# Patient Record
Sex: FEMALE | Race: OTHER | NOT HISPANIC OR LATINO | URBAN - METROPOLITAN AREA
[De-identification: names, ages, dates, MRNs, and addresses within clinical notes are randomized per-mention and may not be internally consistent; named-entity substitution may affect disease eponyms.]

---

## 2023-02-11 ENCOUNTER — EMERGENCY (EMERGENCY)
Facility: HOSPITAL | Age: 88
LOS: 1 days | Discharge: HOME | End: 2023-02-11
Attending: EMERGENCY MEDICINE | Admitting: EMERGENCY MEDICINE
Payer: MEDICARE

## 2023-02-11 VITALS
SYSTOLIC BLOOD PRESSURE: 182 MMHG | TEMPERATURE: 96 F | HEART RATE: 84 BPM | OXYGEN SATURATION: 95 % | DIASTOLIC BLOOD PRESSURE: 72 MMHG | RESPIRATION RATE: 18 BRPM

## 2023-02-11 VITALS
HEART RATE: 65 BPM | TEMPERATURE: 95 F | RESPIRATION RATE: 16 BRPM | DIASTOLIC BLOOD PRESSURE: 79 MMHG | OXYGEN SATURATION: 95 % | HEIGHT: 65 IN | SYSTOLIC BLOOD PRESSURE: 154 MMHG | WEIGHT: 121.92 LBS

## 2023-02-11 LAB
ALBUMIN SERPL ELPH-MCNC: 3.7 G/DL — SIGNIFICANT CHANGE UP (ref 3.3–5)
ALP SERPL-CCNC: 107 U/L — SIGNIFICANT CHANGE UP (ref 40–120)
ALT FLD-CCNC: 33 U/L — SIGNIFICANT CHANGE UP (ref 10–45)
ANION GAP SERPL CALC-SCNC: 13 MMOL/L — SIGNIFICANT CHANGE UP (ref 5–17)
APPEARANCE UR: CLEAR — SIGNIFICANT CHANGE UP
AST SERPL-CCNC: 34 U/L — SIGNIFICANT CHANGE UP (ref 10–40)
BACTERIA # UR AUTO: SIGNIFICANT CHANGE UP /HPF
BASOPHILS # BLD AUTO: 0.02 K/UL — SIGNIFICANT CHANGE UP (ref 0–0.2)
BASOPHILS NFR BLD AUTO: 0.3 % — SIGNIFICANT CHANGE UP (ref 0–2)
BILIRUB SERPL-MCNC: 0.6 MG/DL — SIGNIFICANT CHANGE UP (ref 0.2–1.2)
BILIRUB UR-MCNC: NEGATIVE — SIGNIFICANT CHANGE UP
BUN SERPL-MCNC: 34 MG/DL — HIGH (ref 7–23)
CALCIUM SERPL-MCNC: 9.5 MG/DL — SIGNIFICANT CHANGE UP (ref 8.4–10.5)
CHLORIDE SERPL-SCNC: 96 MMOL/L — SIGNIFICANT CHANGE UP (ref 96–108)
CO2 SERPL-SCNC: 22 MMOL/L — SIGNIFICANT CHANGE UP (ref 22–31)
COLOR SPEC: YELLOW — SIGNIFICANT CHANGE UP
CREAT SERPL-MCNC: 0.93 MG/DL — SIGNIFICANT CHANGE UP (ref 0.5–1.3)
DIFF PNL FLD: NEGATIVE — SIGNIFICANT CHANGE UP
EGFR: 60 ML/MIN/1.73M2 — SIGNIFICANT CHANGE UP
EOSINOPHIL # BLD AUTO: 0.03 K/UL — SIGNIFICANT CHANGE UP (ref 0–0.5)
EOSINOPHIL NFR BLD AUTO: 0.5 % — SIGNIFICANT CHANGE UP (ref 0–6)
EPI CELLS # UR: SIGNIFICANT CHANGE UP /HPF (ref 0–5)
GLUCOSE SERPL-MCNC: 200 MG/DL — HIGH (ref 70–99)
GLUCOSE UR QL: NEGATIVE — SIGNIFICANT CHANGE UP
HCT VFR BLD CALC: 31.4 % — LOW (ref 34.5–45)
HGB BLD-MCNC: 10.4 G/DL — LOW (ref 11.5–15.5)
IMM GRANULOCYTES NFR BLD AUTO: 0.2 % — SIGNIFICANT CHANGE UP (ref 0–0.9)
KETONES UR-MCNC: ABNORMAL MG/DL
LEUKOCYTE ESTERASE UR-ACNC: ABNORMAL
LIDOCAIN IGE QN: 16 U/L — SIGNIFICANT CHANGE UP (ref 7–60)
LYMPHOCYTES # BLD AUTO: 1.05 K/UL — SIGNIFICANT CHANGE UP (ref 1–3.3)
LYMPHOCYTES # BLD AUTO: 16.9 % — SIGNIFICANT CHANGE UP (ref 13–44)
MCHC RBC-ENTMCNC: 30.4 PG — SIGNIFICANT CHANGE UP (ref 27–34)
MCHC RBC-ENTMCNC: 33.1 GM/DL — SIGNIFICANT CHANGE UP (ref 32–36)
MCV RBC AUTO: 91.8 FL — SIGNIFICANT CHANGE UP (ref 80–100)
MONOCYTES # BLD AUTO: 0.09 K/UL — SIGNIFICANT CHANGE UP (ref 0–0.9)
MONOCYTES NFR BLD AUTO: 1.4 % — LOW (ref 2–14)
NEUTROPHILS # BLD AUTO: 5.01 K/UL — SIGNIFICANT CHANGE UP (ref 1.8–7.4)
NEUTROPHILS NFR BLD AUTO: 80.7 % — HIGH (ref 43–77)
NITRITE UR-MCNC: NEGATIVE — SIGNIFICANT CHANGE UP
NRBC # BLD: 0 /100 WBCS — SIGNIFICANT CHANGE UP (ref 0–0)
PH UR: 6 — SIGNIFICANT CHANGE UP (ref 5–8)
PLATELET # BLD AUTO: 176 K/UL — SIGNIFICANT CHANGE UP (ref 150–400)
POTASSIUM SERPL-MCNC: 4 MMOL/L — SIGNIFICANT CHANGE UP (ref 3.5–5.3)
POTASSIUM SERPL-SCNC: 4 MMOL/L — SIGNIFICANT CHANGE UP (ref 3.5–5.3)
PROT SERPL-MCNC: 7 G/DL — SIGNIFICANT CHANGE UP (ref 6–8.3)
PROT UR-MCNC: NEGATIVE MG/DL — SIGNIFICANT CHANGE UP
RBC # BLD: 3.42 M/UL — LOW (ref 3.8–5.2)
RBC # FLD: 16 % — HIGH (ref 10.3–14.5)
RBC CASTS # UR COMP ASSIST: < 5 /HPF — SIGNIFICANT CHANGE UP
SARS-COV-2 RNA SPEC QL NAA+PROBE: NEGATIVE — SIGNIFICANT CHANGE UP
SODIUM SERPL-SCNC: 131 MMOL/L — LOW (ref 135–145)
SP GR SPEC: 1.02 — SIGNIFICANT CHANGE UP (ref 1–1.03)
TROPONIN T SERPL-MCNC: <0.01 NG/ML — SIGNIFICANT CHANGE UP (ref 0–0.01)
UROBILINOGEN FLD QL: 0.2 E.U./DL — SIGNIFICANT CHANGE UP
WBC # BLD: 6.21 K/UL — SIGNIFICANT CHANGE UP (ref 3.8–10.5)
WBC # FLD AUTO: 6.21 K/UL — SIGNIFICANT CHANGE UP (ref 3.8–10.5)
WBC UR QL: < 5 /HPF — SIGNIFICANT CHANGE UP

## 2023-02-11 PROCEDURE — 99284 EMERGENCY DEPT VISIT MOD MDM: CPT

## 2023-02-11 PROCEDURE — 83690 ASSAY OF LIPASE: CPT

## 2023-02-11 PROCEDURE — 93005 ELECTROCARDIOGRAM TRACING: CPT

## 2023-02-11 PROCEDURE — 99285 EMERGENCY DEPT VISIT HI MDM: CPT | Mod: 25

## 2023-02-11 PROCEDURE — 82962 GLUCOSE BLOOD TEST: CPT

## 2023-02-11 PROCEDURE — 85025 COMPLETE CBC W/AUTO DIFF WBC: CPT

## 2023-02-11 PROCEDURE — 71045 X-RAY EXAM CHEST 1 VIEW: CPT | Mod: 26

## 2023-02-11 PROCEDURE — 80053 COMPREHEN METABOLIC PANEL: CPT

## 2023-02-11 PROCEDURE — 71045 X-RAY EXAM CHEST 1 VIEW: CPT

## 2023-02-11 PROCEDURE — 81001 URINALYSIS AUTO W/SCOPE: CPT

## 2023-02-11 PROCEDURE — 85730 THROMBOPLASTIN TIME PARTIAL: CPT

## 2023-02-11 PROCEDURE — 84484 ASSAY OF TROPONIN QUANT: CPT

## 2023-02-11 PROCEDURE — 36415 COLL VENOUS BLD VENIPUNCTURE: CPT

## 2023-02-11 PROCEDURE — 87635 SARS-COV-2 COVID-19 AMP PRB: CPT

## 2023-02-11 PROCEDURE — 85610 PROTHROMBIN TIME: CPT

## 2023-02-11 RX ORDER — SODIUM CHLORIDE 9 MG/ML
1000 INJECTION INTRAMUSCULAR; INTRAVENOUS; SUBCUTANEOUS ONCE
Refills: 0 | Status: COMPLETED | OUTPATIENT
Start: 2023-02-11 | End: 2023-02-11

## 2023-02-11 RX ADMIN — SODIUM CHLORIDE 1000 MILLILITER(S): 9 INJECTION INTRAMUSCULAR; INTRAVENOUS; SUBCUTANEOUS at 11:51

## 2023-02-11 RX ADMIN — SODIUM CHLORIDE 1000 MILLILITER(S): 9 INJECTION INTRAMUSCULAR; INTRAVENOUS; SUBCUTANEOUS at 10:34

## 2023-02-11 NOTE — ED ADULT NURSE NOTE - NSIMPLEMENTINTERV_GEN_ALL_ED
Implemented All Fall with Harm Risk Interventions:  [place] to call system. Call bell, personal items and telephone within reach. Instruct patient to call for assistance. Room bathroom lighting operational. Non-slip footwear when patient is off stretcher. Physically safe environment: no spills, clutter or unnecessary equipment. Stretcher in lowest position, wheels locked, appropriate side rails in place. Provide visual cue, wrist band, yellow gown, etc. Monitor gait and stability. Monitor for mental status changes and reorient to person, place, and time. Review medications for side effects contributing to fall risk. Reinforce activity limits and safety measures with patient and family. Provide visual clues: red socks.

## 2023-02-11 NOTE — ED PROVIDER NOTE - CLINICAL SUMMARY MEDICAL DECISION MAKING FREE TEXT BOX
88 y/o F with a PMHx of stage IV cancer presenting s/p syncopal episode and weakness. Vitals are within normal limits. Labs showing: white count at 6, BUN at 34, creatinine at 9.3m troponin negative, and urine with trace loops but otherwise negative, COVID-19 negative. Plan to give 2L of fluid and reevaluate but if feeling better will discharge. 86 y/o F with a PMHx of stage IV cancer presenting s/p syncopal episode and weakness. Vitals are within normal limits. Labs showing: white count at 6, BUN at 34, creatinine at 9.3m troponin negative, and urine with trace loops but otherwise negative, COVID-19 negative. Plan to give 2L of fluid and reevaluate but if feeling better will discharge.    Pt tolerating po in ED.  Discussed with daughter and agrees on plan for hydration, well balanced meals.   Most likely side effect of chemo causing symptoms.

## 2023-02-11 NOTE — ED PROVIDER NOTE - PHYSICAL EXAMINATION
VITAL SIGNS: I have reviewed nursing notes and confirm.  CONSTITUTIONAL: Weak appearing  SKIN: Agree with RN documentation regarding decubitus evaluation. Remainder of skin exam is warm and dry, no acute rash.  HEAD: Normocephalic; atraumatic.  EYES: PERRL, EOM intact; conjunctiva and sclera clear.  ENT: Dry mucous membranes. No nasal discharge; airway clear.  NECK: Supple; non tender.  CARD: S1, S2 normal; no murmurs, gallops, or rubs. Regular rate and rhythm.  RESP: No wheezes, rales or rhonchi.  ABD: Normal bowel sounds; soft; non-distended; non-tender; no hepatosplenomegaly.  EXT: Normal ROM. No clubbing, cyanosis or edema.  LYMPH: No acute cervical adenopathy.  NEURO: Alert, oriented. Grossly unremarkable.  PSYCH: Cooperative, appropriate.

## 2023-02-11 NOTE — ED PROVIDER NOTE - NSFOLLOWUPINSTRUCTIONS_ED_ALL_ED_FT
Stay hydrated and eat well balanced meals.  Follow up with your doctor at [facility].  Return to ED with any worsening symptoms or other concerns.        Syncope, Adult    Outline of the head showing blood vessels that supply the brain.   Syncope refers to a condition in which a person temporarily loses consciousness. Syncope may also be called fainting or passing out. It is caused by a sudden decrease in blood flow to the brain. This can happen for a variety of reasons.    Most causes of syncope are not dangerous. It can be triggered by things such as needle sticks, seeing blood, pain, or intense emotion. However, syncope can also be a sign of a serious medical problem, such as a heart abnormality. Other causes can include dehydration, migraines, or taking medicines that lower blood pressure. Your health care provider may do tests to find the reason why you are having syncope.    If you faint, get medical help right away. Call your local emergency services (911 in the [place]      Follow these instructions at home:    Pay attention to any changes in your symptoms. Take these actions to stay safe and to help relieve your symptoms:    Knowing when you may be about to faint   •Signs that you may be about to faint include:  •Feeling dizzy, weak, light-headed, or like the room is spinning.      •Feeling nauseous.      •Seeing spots or seeing all white or all black in your field of vision.      •Having cold, clammy skin or feeling warm and sweaty.      •Hearing ringing in the ears (tinnitus).      •If you start to feel like you might faint, sit or lie down right away. If sitting, put your head down between your legs. If lying down, raise (elevate) your feet above the level of your heart.  •Breathe deeply and steadily. Wait until all the symptoms have passed.      •Have someone stay with you until you feel stable.        Medicines     •Take over-the-counter and prescription medicines only as told by your health care provider.      •If you are taking blood pressure or heart medicine, get up slowly and take several minutes to sit and then stand. This can reduce dizziness and decrease the risk of syncope.      Lifestyle     • Do not drive, use machinery, or play sports until your health care provider says it is okay.      • Do not drink alcohol.      • Do not use any products that contain nicotine or tobacco. These products include cigarettes, chewing tobacco, and vaping devices, such as e-cigarettes. If you need help quitting, ask your health care provider.      •Avoid hot tubs and saunas.      General instructions     •Talk with your health care provider about your symptoms. You may need to have testing to understand the cause of your syncope.      •Drink enough fluid to keep your urine pale yellow.    •Avoid prolonged standing. If you must stand for a long time, do movements such as:  •Moving your legs.      •Crossing your legs.      •Flexing and stretching your leg muscles.      •Squatting.        •Keep all follow-up visits. This is important.        Contact a health care provider if:    •You have episodes of near fainting.        Get help right away if:    •You faint.      •You hit your head or are injured after fainting.    •You have any of these symptoms that may indicate trouble with your heart:  •Fast or irregular heartbeats (palpitations).      •Unusual pain in your chest, abdomen, or back.      •Shortness of breath.        •You have a seizure.      •You have a severe headache.      •You are confused.      •You have vision problems.      •You have severe weakness or trouble walking.      •You are bleeding from your mouth or rectum, or you have black or tarry stool.      These symptoms may represent a serious problem that is an emergency. Do not wait to see if your symptoms will go away. Get medical help right away. Call your local emergency services (911 in the [place] Do not drive yourself to the hospital.       Summary    •Syncope refers to a condition in which a person temporarily loses consciousness. Syncope may also be called fainting or passing out. It is caused by a sudden decrease in blood flow to the brain.      •Signs that you may be about to faint include dizziness, feeling light-headed, feeling nauseous, sudden vision changes, or cold, clammy skin.      •Even though most causes of syncope are not dangerous, syncope can be a sign of a serious medical problem. Get help right away if you faint.      •If you start to feel like you might faint, sit or lie down right away. If sitting, put your head down between your legs. If lying down, raise (elevate) your feet above the level of your heart.      This information is not intended to replace advice given to you by your health care provider. Make sure you discuss any questions you have with your health care provider.      Document Revised: 04/28/2022 Document Reviewed: 04/28/2022    Elsevier Patient Education © 2022 [employer]

## 2023-02-11 NOTE — ED ADULT NURSE NOTE - OBJECTIVE STATEMENT
.  87years female alert mental state (AOX3) received by EMS.  -Allergy: N/A.  -complain of syncope  Hx of gallbladder CA. pt had syncope today morning. pt got chemo Tx on Wednesday. pt had nausea and vomiting on EMS. pt presents no complain.  -denied chest pain, SOB, hitting head, LOC, abdomen pain, dizziness, headache, sensory change.  Pt is in the bed comfortably at this time. Will continue to monitor and document any changes.

## 2023-02-11 NOTE — ED ADULT TRIAGE NOTE - CHIEF COMPLAINT QUOTE
Pt presents to ED by EMS C/O one episode of syncope at home witnessed by Daughter. Daughter states, " I noticed her legs were spread out and she fainted on the bar stool, she lost consciousness so I called an ambulance but then she came to, she was complaining of nausea when she woke up". Daughter denies falls/ head strike. Pt received 6th chemo treatment Wed, hx gallbladder CA. Pt Neuro intact, awake and alert, complains of generalized fatigue.

## 2023-02-11 NOTE — ED ADULT TRIAGE NOTE - ADDITIONAL SAFETY/BANDS...
Patient states she has a really bad migraine right now and she knows here provider may not be in clinic tomorrow.   Additional Safety/Bands:

## 2023-02-11 NOTE — ED PROVIDER NOTE - PATIENT PORTAL LINK FT
You can access the FollowMyHealth Patient Portal offered by [facility] by registering at the following website: [email or web address]. By joining [facility]’s FollowMyHealth portal, you will also be able to view your health information using other applications (apps) compatible with our system.

## 2023-02-11 NOTE — ED PROVIDER NOTE - WET READ LAUNCH FT
Partially impaired: cannot see medication labels or newsprint, but can see obstacles in path, and the surrounding layout; can count fingers at arm's length
There are no Wet Read(s) to document.

## 2023-02-11 NOTE — ED PROVIDER NOTE - OBJECTIVE STATEMENT
86 y/o F with a PMHx of stage IV gallbladder cancer (chemo at [facility] every 2 weeks, off for 1 week) is here with her daughter as they love together. Pt states she usually has no side effects from chemo. Pt's last chemo was Wednesday and today pt felt weak, got dizzy, and passed out for a few minutes in the chair. In ED pt is just tired and weak. Pt denies any fevers or chills and states she just wanted to sleep. Pt also had urinary frequency 2d ago but denies any vomiting or diarrhea.

## 2023-02-11 NOTE — ED PROVIDER NOTE - IV ALTEPLASE EXCL REL HIDDEN
Abdomen , soft, nontender, nondistended , no guarding or rigidity , no masses palpable , normal bowel sounds , Liver and Spleen , no hepatomegaly present , no hepatosplenomegaly , liver nontender , spleen not palpable, no inguinal hernia appreciated
show

## 2023-02-14 DIAGNOSIS — C23 MALIGNANT NEOPLASM OF GALLBLADDER: ICD-10-CM

## 2023-02-14 DIAGNOSIS — R55 SYNCOPE AND COLLAPSE: ICD-10-CM

## 2023-02-14 DIAGNOSIS — R53.1 WEAKNESS: ICD-10-CM

## 2023-02-14 DIAGNOSIS — I44.7 LEFT BUNDLE-BRANCH BLOCK, UNSPECIFIED: ICD-10-CM

## 2023-02-14 DIAGNOSIS — R42 DIZZINESS AND GIDDINESS: ICD-10-CM

## 2023-02-14 DIAGNOSIS — Z20.822 CONTACT WITH AND (SUSPECTED) EXPOSURE TO COVID-19: ICD-10-CM

## 2023-02-14 DIAGNOSIS — R35.0 FREQUENCY OF MICTURITION: ICD-10-CM

## 2023-05-02 ENCOUNTER — EMERGENCY (EMERGENCY)
Facility: HOSPITAL | Age: 88
LOS: 1 days | Discharge: HOME | End: 2023-05-02
Attending: EMERGENCY MEDICINE | Admitting: EMERGENCY MEDICINE
Payer: MEDICARE

## 2023-05-02 VITALS
SYSTOLIC BLOOD PRESSURE: 146 MMHG | OXYGEN SATURATION: 94 % | RESPIRATION RATE: 18 BRPM | WEIGHT: 123.02 LBS | HEIGHT: 61 IN | DIASTOLIC BLOOD PRESSURE: 64 MMHG | HEART RATE: 73 BPM | TEMPERATURE: 98 F

## 2023-05-02 VITALS
DIASTOLIC BLOOD PRESSURE: 68 MMHG | TEMPERATURE: 98 F | HEART RATE: 78 BPM | OXYGEN SATURATION: 96 % | SYSTOLIC BLOOD PRESSURE: 186 MMHG | RESPIRATION RATE: 18 BRPM

## 2023-05-02 DIAGNOSIS — M25.78 OSTEOPHYTE, VERTEBRAE: ICD-10-CM

## 2023-05-02 DIAGNOSIS — Z23 ENCOUNTER FOR IMMUNIZATION: ICD-10-CM

## 2023-05-02 DIAGNOSIS — R55 SYNCOPE AND COLLAPSE: ICD-10-CM

## 2023-05-02 DIAGNOSIS — S80.12XA CONTUSION OF LEFT LOWER LEG, INITIAL ENCOUNTER: ICD-10-CM

## 2023-05-02 DIAGNOSIS — I44.7 LEFT BUNDLE-BRANCH BLOCK, UNSPECIFIED: ICD-10-CM

## 2023-05-02 DIAGNOSIS — S01.21XA LACERATION WITHOUT FOREIGN BODY OF NOSE, INITIAL ENCOUNTER: ICD-10-CM

## 2023-05-02 DIAGNOSIS — M77.8 OTHER ENTHESOPATHIES, NOT ELSEWHERE CLASSIFIED: ICD-10-CM

## 2023-05-02 DIAGNOSIS — M89.8X1 OTHER SPECIFIED DISORDERS OF BONE, SHOULDER: ICD-10-CM

## 2023-05-02 DIAGNOSIS — M43.12 SPONDYLOLISTHESIS, CERVICAL REGION: ICD-10-CM

## 2023-05-02 DIAGNOSIS — M48.02 SPINAL STENOSIS, CERVICAL REGION: ICD-10-CM

## 2023-05-02 DIAGNOSIS — S40.012A CONTUSION OF LEFT SHOULDER, INITIAL ENCOUNTER: ICD-10-CM
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
## 2023-05-02 DIAGNOSIS — S02.2XXA FRACTURE OF NASAL BONES, INITIAL ENCOUNTER FOR CLOSED FRACTURE: ICD-10-CM

## 2023-05-02 DIAGNOSIS — C23 MALIGNANT NEOPLASM OF GALLBLADDER: ICD-10-CM

## 2023-05-02 LAB
ALBUMIN SERPL ELPH-MCNC: 3.8 G/DL — SIGNIFICANT CHANGE UP (ref 3.3–5)
ALP SERPL-CCNC: 113 U/L — SIGNIFICANT CHANGE UP (ref 40–120)
ALT FLD-CCNC: 19 U/L — SIGNIFICANT CHANGE UP (ref 10–45)
ANION GAP SERPL CALC-SCNC: 13 MMOL/L — SIGNIFICANT CHANGE UP (ref 5–17)
ANISOCYTOSIS BLD QL: SIGNIFICANT CHANGE UP
AST SERPL-CCNC: 37 U/L — SIGNIFICANT CHANGE UP (ref 10–40)
BASOPHILS # BLD AUTO: 0 K/UL — SIGNIFICANT CHANGE UP (ref 0–0.2)
BASOPHILS NFR BLD AUTO: 0 % — SIGNIFICANT CHANGE UP (ref 0–2)
BILIRUB SERPL-MCNC: 0.3 MG/DL — SIGNIFICANT CHANGE UP (ref 0.2–1.2)
BUN SERPL-MCNC: 34 MG/DL — HIGH (ref 7–23)
CALCIUM SERPL-MCNC: 9 MG/DL — SIGNIFICANT CHANGE UP (ref 8.4–10.5)
CHLORIDE SERPL-SCNC: 101 MMOL/L — SIGNIFICANT CHANGE UP (ref 96–108)
CK MB CFR SERPL CALC: 1.5 NG/ML — SIGNIFICANT CHANGE UP (ref 0–6.7)
CK SERPL-CCNC: 46 U/L — SIGNIFICANT CHANGE UP (ref 25–170)
CO2 SERPL-SCNC: 21 MMOL/L — LOW (ref 22–31)
CREAT SERPL-MCNC: 1.01 MG/DL — SIGNIFICANT CHANGE UP (ref 0.5–1.3)
EGFR: 55 ML/MIN/1.73M2 — LOW
EOSINOPHIL # BLD AUTO: 0.02 K/UL — SIGNIFICANT CHANGE UP (ref 0–0.5)
EOSINOPHIL NFR BLD AUTO: 0.9 % — SIGNIFICANT CHANGE UP (ref 0–6)
GLUCOSE SERPL-MCNC: 150 MG/DL — HIGH (ref 70–99)
HCT VFR BLD CALC: 22.3 % — LOW (ref 34.5–45)
HGB BLD-MCNC: 7.4 G/DL — LOW (ref 11.5–15.5)
HYPOCHROMIA BLD QL: SLIGHT — SIGNIFICANT CHANGE UP
LYMPHOCYTES # BLD AUTO: 1.02 K/UL — SIGNIFICANT CHANGE UP (ref 1–3.3)
LYMPHOCYTES # BLD AUTO: 38.3 % — SIGNIFICANT CHANGE UP (ref 13–44)
MACROCYTES BLD QL: SIGNIFICANT CHANGE UP
MANUAL SMEAR VERIFICATION: SIGNIFICANT CHANGE UP
MCHC RBC-ENTMCNC: 33.2 GM/DL — SIGNIFICANT CHANGE UP (ref 32–36)
MCHC RBC-ENTMCNC: 33.5 PG — SIGNIFICANT CHANGE UP (ref 27–34)
MCV RBC AUTO: 100.9 FL — HIGH (ref 80–100)
MICROCYTES BLD QL: SLIGHT — SIGNIFICANT CHANGE UP
MONOCYTES # BLD AUTO: 0.21 K/UL — SIGNIFICANT CHANGE UP (ref 0–0.9)
MONOCYTES NFR BLD AUTO: 7.8 % — SIGNIFICANT CHANGE UP (ref 2–14)
NEUTROPHILS # BLD AUTO: 1.42 K/UL — LOW (ref 1.8–7.4)
NEUTROPHILS NFR BLD AUTO: 51.3 % — SIGNIFICANT CHANGE UP (ref 43–77)
NEUTS BAND # BLD: 1.7 % — SIGNIFICANT CHANGE UP (ref 0–8)
NRBC # BLD: 1 /100 — HIGH (ref 0–0)
NRBC # BLD: SIGNIFICANT CHANGE UP /100 WBCS (ref 0–0)
OVALOCYTES BLD QL SMEAR: SLIGHT — SIGNIFICANT CHANGE UP
PLAT MORPH BLD: NORMAL — SIGNIFICANT CHANGE UP
PLATELET # BLD AUTO: 104 K/UL — LOW (ref 150–400)
POIKILOCYTOSIS BLD QL AUTO: SLIGHT — SIGNIFICANT CHANGE UP
POLYCHROMASIA BLD QL SMEAR: SLIGHT — SIGNIFICANT CHANGE UP
POTASSIUM SERPL-MCNC: 4 MMOL/L — SIGNIFICANT CHANGE UP (ref 3.5–5.3)
POTASSIUM SERPL-SCNC: 4 MMOL/L — SIGNIFICANT CHANGE UP (ref 3.5–5.3)
PROT SERPL-MCNC: 6.5 G/DL — SIGNIFICANT CHANGE UP (ref 6–8.3)
RBC # BLD: 2.21 M/UL — LOW (ref 3.8–5.2)
RBC # FLD: 18.1 % — HIGH (ref 10.3–14.5)
RBC BLD AUTO: ABNORMAL
SCHISTOCYTES BLD QL AUTO: SLIGHT — SIGNIFICANT CHANGE UP
SODIUM SERPL-SCNC: 135 MMOL/L — SIGNIFICANT CHANGE UP (ref 135–145)
TROPONIN T SERPL-MCNC: <0.01 NG/ML — SIGNIFICANT CHANGE UP (ref 0–0.01)
WBC # BLD: 2.67 K/UL — LOW (ref 3.8–10.5)
WBC # FLD AUTO: 2.67 K/UL — LOW (ref 3.8–10.5)

## 2023-05-02 PROCEDURE — 99285 EMERGENCY DEPT VISIT HI MDM: CPT | Mod: 25

## 2023-05-02 PROCEDURE — 70450 CT HEAD/BRAIN W/O DYE: CPT | Mod: MA

## 2023-05-02 PROCEDURE — 80053 COMPREHEN METABOLIC PANEL: CPT

## 2023-05-02 PROCEDURE — 70486 CT MAXILLOFACIAL W/O DYE: CPT | Mod: MA

## 2023-05-02 PROCEDURE — 85025 COMPLETE CBC W/AUTO DIFF WBC: CPT

## 2023-05-02 PROCEDURE — 90715 TDAP VACCINE 7 YRS/> IM: CPT

## 2023-05-02 PROCEDURE — 72125 CT NECK SPINE W/O DYE: CPT | Mod: MA

## 2023-05-02 PROCEDURE — 73030 X-RAY EXAM OF SHOULDER: CPT

## 2023-05-02 PROCEDURE — 73590 X-RAY EXAM OF LOWER LEG: CPT

## 2023-05-02 PROCEDURE — 70486 CT MAXILLOFACIAL W/O DYE: CPT | Mod: 26,MA

## 2023-05-02 PROCEDURE — 99285 EMERGENCY DEPT VISIT HI MDM: CPT | Mod: FS,25

## 2023-05-02 PROCEDURE — 12011 RPR F/E/E/N/L/M 2.5 CM/<: CPT

## 2023-05-02 PROCEDURE — 73590 X-RAY EXAM OF LOWER LEG: CPT | Mod: 26,LT

## 2023-05-02 PROCEDURE — 93005 ELECTROCARDIOGRAM TRACING: CPT | Mod: XU

## 2023-05-02 PROCEDURE — 82550 ASSAY OF CK (CPK): CPT

## 2023-05-02 PROCEDURE — 72125 CT NECK SPINE W/O DYE: CPT | Mod: 26,MA

## 2023-05-02 PROCEDURE — 90471 IMMUNIZATION ADMIN: CPT

## 2023-05-02 PROCEDURE — G0168: CPT

## 2023-05-02 PROCEDURE — 84484 ASSAY OF TROPONIN QUANT: CPT

## 2023-05-02 PROCEDURE — 36415 COLL VENOUS BLD VENIPUNCTURE: CPT

## 2023-05-02 PROCEDURE — 82553 CREATINE MB FRACTION: CPT

## 2023-05-02 PROCEDURE — 70450 CT HEAD/BRAIN W/O DYE: CPT | Mod: 26,MA

## 2023-05-02 PROCEDURE — 73030 X-RAY EXAM OF SHOULDER: CPT | Mod: 26,LT

## 2023-05-02 RX ORDER — SODIUM CHLORIDE 9 MG/ML
1000 INJECTION INTRAMUSCULAR; INTRAVENOUS; SUBCUTANEOUS ONCE
Refills: 0 | Status: COMPLETED | OUTPATIENT
Start: 2023-05-02 | End: 2023-05-02

## 2023-05-02 RX ORDER — ACETAMINOPHEN 500 MG
650 TABLET ORAL ONCE
Refills: 0 | Status: COMPLETED | OUTPATIENT
Start: 2023-05-02 | End: 2023-05-02

## 2023-05-02 RX ORDER — TETANUS TOXOID, REDUCED DIPHTHERIA TOXOID AND ACELLULAR PERTUSSIS VACCINE, ADSORBED 5; 2.5; 8; 8; 2.5 [IU]/.5ML; [IU]/.5ML; UG/.5ML; UG/.5ML; UG/.5ML
0.5 SUSPENSION INTRAMUSCULAR ONCE
Refills: 0 | Status: COMPLETED | OUTPATIENT
Start: 2023-05-02 | End: 2023-05-02

## 2023-05-02 RX ADMIN — SODIUM CHLORIDE 1000 MILLILITER(S): 9 INJECTION INTRAMUSCULAR; INTRAVENOUS; SUBCUTANEOUS at 10:31

## 2023-05-02 RX ADMIN — Medication 650 MILLIGRAM(S): at 10:31

## 2023-05-02 RX ADMIN — TETANUS TOXOID, REDUCED DIPHTHERIA TOXOID AND ACELLULAR PERTUSSIS VACCINE, ADSORBED 0.5 MILLILITER(S): 5; 2.5; 8; 8; 2.5 SUSPENSION INTRAMUSCULAR at 10:31

## 2023-05-02 NOTE — ED PROVIDER NOTE - PATIENT PORTAL LINK FT
You can access the FollowMyHealth Patient Portal offered by [facility] by registering at the following website: [email or web address]. By joining [facility]’s FollowMyHealth portal, you will also be able to view your health information using other applications (apps) compatible with our system.

## 2023-05-02 NOTE — ED PROVIDER NOTE - CLINICAL SUMMARY MEDICAL DECISION MAKING FREE TEXT BOX
86 yo F with pmh of gallbladder cancer on chemo (last treatment 1 week ago) presents s/p syncope w/ fall this morning.  Pt hit her head, nose and L shoulder and L leg on the floor. Initially had some bleeding to the bridge of her nose. Denies ha, dizziness, n/v, cp, sob, fever, chills, palpitations. Pt states she has been eating and drinking normally. neuro intact. +lac to nose. +ecchymosis to L shoulder and LLE. EKG old LBBB. Labs, XR, CT

## 2023-05-02 NOTE — ED ADULT NURSE NOTE - OBJECTIVE STATEMENT
.  87years female alert mental state (AOX3) received by EMS.  pt is ambulatory herself.  -complain of fall.  Hx fo bladder cancer. pt fell down when pt put her shoes in tody morning. pt did not have memory at that time.   -assess: 1cm laceration of nose. bruise of Lt shoulder and both lower legs.   -denied chest pain, SOB, fever, abdomen pain.  Pt is in the bed comfortably at this time. Will continue to monitor and document any changes.

## 2023-05-02 NOTE — ED PROVIDER NOTE - ATTENDING APP SHARED VISIT CONTRIBUTION OF CARE
87 F hx gallbladder CA on chemo last 1 week ago now with syncope and fall after bending over to put on slippers, positional in nature.  Feel and hit head with loc.  Pt feels fine.  No prodrome, no cp or sob.  VSS.  Patient is alert, oriented x person, place and time.  Cranial nerves 2-12 are intact.  Normal gait and speech.  Cerebellar testing normal:  negative Romberg, normal coordination and normal finger to nose, heal to shin and rapid alternating movements.  Normal proprioception and sensory exam.  No pronator drift.  5/5 bl upper extremity and lower extremity strength.  Mild ttp LLE.  Ambulatory.  Plan EKG, labs, CT head, xray, ivf and reassess VS/orthostatics.  Unlikely cardiac syncope.

## 2023-05-02 NOTE — ED ADULT NURSE NOTE - PRIMARY CARE PROVIDER
Z pack take 2 tablets today and 1 tablet for 4 days after  Use Flonase 1 spray each nostril in the morning and at night  Use Zyrtec at night before going to bed  And use Mucinex 12 hour during the day for congestion  Rest and Hydrate  Take BP daily, and write them down 
MD

## 2023-05-02 NOTE — ED ADULT TRIAGE NOTE - CHIEF COMPLAINT QUOTE
Pt w PMH active bladder cancer on chemo reports bending over to put shoes on and fell around 0835 this morning. pt does not fully know what happened. pt states " I was just on the floor." + head injury. no lost of LOC. Daughter present, states she was on the floor for about 30 seconds only. no blood thinners. Lac to nose, bruise to left leg, and left forehead.  by ems.

## 2023-05-02 NOTE — ED PROVIDER NOTE - PHYSICAL EXAMINATION
CONSTITUTIONAL: Well-appearing;  in no apparent distress.   HEAD: Normocephalic; atraumatic.   EYES: PERRL; EOM intact; conjunctiva and sclera clear; 0.5cm superficial lac to bridge of nose, no active bleeding   ENT: normal nose; no rhinorrhea; normal pharynx with no erythema or lesions.   NECK: Supple; non-tender; no LAD  CARDIOVASCULAR: Normal S1, S2; No audible murmurs. Regular rate and rhythm.   RESPIRATORY: Breathing easily; breath sounds clear and equal bilaterally; no wheezes, rhonchi, or rales.  GI: Soft; non-distended; non-tender; no palpable organomegaly.   [facility]: FROM at all extremities, normal tone; L shoulder +ecchymosis, minimal tenderness, FROM. LLE- ecchymosis to L lateral shin, +tenderness. FROM at hips and knees   EXT: No cyanosis or edema; N/V intact  SKIN: Normal for age and race; warm; dry; good turgor; no apparent lesions or rash.   NEURO:  AAO x 3, CN II-XII intact, normal speech, strength 5/5 bilateral upper and lower extremities, sensation intact, cerebellum intact, no ataxia, follows commands appropriately   PSYCHOLOGICAL: The patient’s mood and manner are appropriate.

## 2023-05-02 NOTE — ED PROVIDER NOTE - NSFOLLOWUPINSTRUCTIONS_ED_ALL_ED_FT
Syncope    Syncope is when you temporarily lose consciousness, also called fainting or passing out. It is caused by a sudden decrease in blood flow to the brain. Even though most causes of syncope are not dangerous, syncope can possibly be a sign of a serious medical problem. Signs that you may be about to faint include feeling dizzy, lightheaded, nausea, visual changes, or cold/clammy skin. Do not drive, operate heavy machinery, or play sports until your health care provider says it is okay.    SEEK IMMEDIATE MEDICAL CARE IF YOU HAVE ANY OF THE FOLLOWING SYMPTOMS: severe headache, pain in your chest/abdomen/back, bleeding from your mouth or rectum, palpitations, shortness of breath, pain with breathing, seizure, confusion, or trouble walking.    Laceration    A laceration is a cut that goes through all of the layers of the skin and into the tissue that is right under the skin. Some lacerations heal on their own. Others need to be closed with skin adhesive strips, skin glue, stitches (sutures), or staples. Proper laceration care minimizes the risk of infection and helps the laceration to heal better.  If non-absorbable stitches or staples have been placed, they must be taken out within the time frame instructed by your healthcare provider.    SEEK IMMEDIATE MEDICAL CARE IF YOU HAVE ANY OF THE FOLLOWING SYMPTOMS: swelling around the wound, worsening pain, drainage from the wound, red streaking going away from your wound, inability to move finger or toe near the laceration, or discoloration of skin near the laceration.    Contusion    A contusion is a deep bruise. Contusions are the result of a blunt injury to tissues and muscle fibers under the skin. The skin overlying the contusion may turn blue, purple, or yellow. Symptoms also include pain and swelling in the injured area.    SEEK IMMEDIATE MEDICAL CARE IF YOU HAVE ANY OF THE FOLLOWING SYMPTOMS: severe pain, numbness, tingling, pain, weakness, or skin color/temperature change in any part of your body distal to the injury.

## 2023-05-02 NOTE — ED ADULT NURSE NOTE - NSIMPLEMENTINTERV_GEN_ALL_ED
Implemented All Fall with Harm Risk Interventions:  [place] to call system. Call bell, personal items and telephone within reach. Instruct patient to call for assistance. Room bathroom lighting operational. Non-slip footwear when patient is off stretcher. Physically safe environment: no spills, clutter or unnecessary equipment. Stretcher in lowest position, wheels locked, appropriate side rails in place. Provide visual cue, wrist band, yellow gown, etc. Monitor gait and stability. Monitor for mental status changes and reorient to person, place, and time. Review medications for side effects contributing to fall risk. Reinforce activity limits and safety measures with patient and family. Provide visual clues: red socks.

## 2023-05-02 NOTE — ED PROVIDER NOTE - OBJECTIVE STATEMENT
88 yo F with pmh of gallbladder cancer on chemo (last treatment 1 week ago) presents s/p fall this morning. Pt states she bent over to put her slippers on and fainted hitting her head on the floor. Denies prodrome. Not on AC. Pt was out for a few seconds. Witnessed by daughter. Pt had a previous syncopal episode about 1 year ago after a higher dose of chemo. Pt hit her head, nose and L shoulder and L leg on the floor. Initially had some bleeding to the bridge of her nose. Denies ha, dizziness, n/v, cp, sob, fever, chills, palpitations. Pt states she has been eating and drinking normally.

## 2024-01-26 ENCOUNTER — INPATIENT (INPATIENT)
Facility: HOSPITAL | Age: 89
LOS: 2 days | Discharge: HOME HEALTH | DRG: 312 | End: 2024-01-29
Attending: INTERNAL MEDICINE | Admitting: INTERNAL MEDICINE
Payer: MEDICARE

## 2024-01-26 VITALS
OXYGEN SATURATION: 95 % | SYSTOLIC BLOOD PRESSURE: 151 MMHG | DIASTOLIC BLOOD PRESSURE: 80 MMHG | TEMPERATURE: 98 F | HEART RATE: 76 BPM | WEIGHT: 117.07 LBS | RESPIRATION RATE: 16 BRPM

## 2024-01-26 DIAGNOSIS — C23 MALIGNANT NEOPLASM OF GALLBLADDER: ICD-10-CM

## 2024-01-26 DIAGNOSIS — R55 SYNCOPE AND COLLAPSE: ICD-10-CM

## 2024-01-26 DIAGNOSIS — E03.9 HYPOTHYROIDISM, UNSPECIFIED: ICD-10-CM

## 2024-01-26 DIAGNOSIS — Z29.9 ENCOUNTER FOR PROPHYLACTIC MEASURES, UNSPECIFIED: ICD-10-CM

## 2024-01-26 DIAGNOSIS — Z86.39 PERSONAL HISTORY OF OTHER ENDOCRINE, NUTRITIONAL AND METABOLIC DISEASE: ICD-10-CM

## 2024-01-26 DIAGNOSIS — E87.1 HYPO-OSMOLALITY AND HYPONATREMIA: ICD-10-CM

## 2024-01-26 DIAGNOSIS — L40.0 PSORIASIS VULGARIS: ICD-10-CM

## 2024-01-26 LAB
ACTH SER-ACNC: 12.2 PG/ML — SIGNIFICANT CHANGE UP (ref 7.2–63.3)
ANION GAP SERPL CALC-SCNC: 11 MMOL/L — SIGNIFICANT CHANGE UP (ref 5–17)
ANION GAP SERPL CALC-SCNC: 13 MMOL/L — SIGNIFICANT CHANGE UP (ref 5–17)
APPEARANCE UR: CLEAR — SIGNIFICANT CHANGE UP
BACTERIA # UR AUTO: NEGATIVE /HPF — SIGNIFICANT CHANGE UP
BASOPHILS # BLD AUTO: 0.02 K/UL — SIGNIFICANT CHANGE UP (ref 0–0.2)
BASOPHILS NFR BLD AUTO: 0.2 % — SIGNIFICANT CHANGE UP (ref 0–2)
BILIRUB UR-MCNC: NEGATIVE — SIGNIFICANT CHANGE UP
BUN SERPL-MCNC: 18 MG/DL — SIGNIFICANT CHANGE UP (ref 7–23)
BUN SERPL-MCNC: 21 MG/DL — SIGNIFICANT CHANGE UP (ref 7–23)
CALCIUM SERPL-MCNC: 9.3 MG/DL — SIGNIFICANT CHANGE UP (ref 8.4–10.5)
CALCIUM SERPL-MCNC: 9.4 MG/DL — SIGNIFICANT CHANGE UP (ref 8.4–10.5)
CAST: 1 /LPF — SIGNIFICANT CHANGE UP (ref 0–4)
CHLORIDE SERPL-SCNC: 92 MMOL/L — LOW (ref 96–108)
CHLORIDE SERPL-SCNC: 93 MMOL/L — LOW (ref 96–108)
CO2 SERPL-SCNC: 22 MMOL/L — SIGNIFICANT CHANGE UP (ref 22–31)
CO2 SERPL-SCNC: 23 MMOL/L — SIGNIFICANT CHANGE UP (ref 22–31)
COLOR SPEC: YELLOW — SIGNIFICANT CHANGE UP
CORTIS AM PEAK SERPL-MCNC: 16.32 UG/DL — SIGNIFICANT CHANGE UP (ref 6.02–18.4)
CREAT ?TM UR-MCNC: 25 MG/DL — SIGNIFICANT CHANGE UP
CREAT SERPL-MCNC: 0.83 MG/DL — SIGNIFICANT CHANGE UP (ref 0.5–1.3)
CREAT SERPL-MCNC: 0.89 MG/DL — SIGNIFICANT CHANGE UP (ref 0.5–1.3)
DIFF PNL FLD: NEGATIVE — SIGNIFICANT CHANGE UP
EGFR: 63 ML/MIN/1.73M2 — SIGNIFICANT CHANGE UP
EGFR: 69 ML/MIN/1.73M2 — SIGNIFICANT CHANGE UP
EOSINOPHIL # BLD AUTO: 0.18 K/UL — SIGNIFICANT CHANGE UP (ref 0–0.5)
EOSINOPHIL NFR BLD AUTO: 2.1 % — SIGNIFICANT CHANGE UP (ref 0–6)
GLUCOSE BLDC GLUCOMTR-MCNC: 227 MG/DL — HIGH (ref 70–99)
GLUCOSE BLDC GLUCOMTR-MCNC: 276 MG/DL — HIGH (ref 70–99)
GLUCOSE BLDC GLUCOMTR-MCNC: 285 MG/DL — HIGH (ref 70–99)
GLUCOSE BLDC GLUCOMTR-MCNC: 312 MG/DL — HIGH (ref 70–99)
GLUCOSE SERPL-MCNC: 237 MG/DL — HIGH (ref 70–99)
GLUCOSE SERPL-MCNC: 267 MG/DL — HIGH (ref 70–99)
GLUCOSE UR QL: NEGATIVE MG/DL — SIGNIFICANT CHANGE UP
HCT VFR BLD CALC: 31.1 % — LOW (ref 34.5–45)
HGB BLD-MCNC: 10.7 G/DL — LOW (ref 11.5–15.5)
IMM GRANULOCYTES NFR BLD AUTO: 0.5 % — SIGNIFICANT CHANGE UP (ref 0–0.9)
KETONES UR-MCNC: 15 MG/DL
LEUKOCYTE ESTERASE UR-ACNC: ABNORMAL
LYMPHOCYTES # BLD AUTO: 1.15 K/UL — SIGNIFICANT CHANGE UP (ref 1–3.3)
LYMPHOCYTES # BLD AUTO: 13.2 % — SIGNIFICANT CHANGE UP (ref 13–44)
MCHC RBC-ENTMCNC: 30.9 PG — SIGNIFICANT CHANGE UP (ref 27–34)
MCHC RBC-ENTMCNC: 34.4 GM/DL — SIGNIFICANT CHANGE UP (ref 32–36)
MCV RBC AUTO: 89.9 FL — SIGNIFICANT CHANGE UP (ref 80–100)
MONOCYTES # BLD AUTO: 0.48 K/UL — SIGNIFICANT CHANGE UP (ref 0–0.9)
MONOCYTES NFR BLD AUTO: 5.5 % — SIGNIFICANT CHANGE UP (ref 2–14)
NEUTROPHILS # BLD AUTO: 6.84 K/UL — SIGNIFICANT CHANGE UP (ref 1.8–7.4)
NEUTROPHILS NFR BLD AUTO: 78.5 % — HIGH (ref 43–77)
NITRITE UR-MCNC: NEGATIVE — SIGNIFICANT CHANGE UP
NRBC # BLD: 0 /100 WBCS — SIGNIFICANT CHANGE UP (ref 0–0)
OSMOLALITY UR: 270 MOSM/KG — LOW (ref 300–900)
PH UR: 6.5 — SIGNIFICANT CHANGE UP (ref 5–8)
PLATELET # BLD AUTO: 167 K/UL — SIGNIFICANT CHANGE UP (ref 150–400)
POTASSIUM SERPL-MCNC: 4.2 MMOL/L — SIGNIFICANT CHANGE UP (ref 3.5–5.3)
POTASSIUM SERPL-MCNC: 5 MMOL/L — SIGNIFICANT CHANGE UP (ref 3.5–5.3)
POTASSIUM SERPL-MCNC: SIGNIFICANT CHANGE UP MMOL/L (ref 3.5–5.3)
POTASSIUM SERPL-SCNC: 4.2 MMOL/L — SIGNIFICANT CHANGE UP (ref 3.5–5.3)
POTASSIUM SERPL-SCNC: 5 MMOL/L — SIGNIFICANT CHANGE UP (ref 3.5–5.3)
POTASSIUM SERPL-SCNC: SIGNIFICANT CHANGE UP MMOL/L (ref 3.5–5.3)
PROT UR-MCNC: NEGATIVE MG/DL — SIGNIFICANT CHANGE UP
RBC # BLD: 3.46 M/UL — LOW (ref 3.8–5.2)
RBC # FLD: 13.5 % — SIGNIFICANT CHANGE UP (ref 10.3–14.5)
RBC CASTS # UR COMP ASSIST: 0 /HPF — SIGNIFICANT CHANGE UP (ref 0–4)
SODIUM SERPL-SCNC: 126 MMOL/L — LOW (ref 135–145)
SODIUM SERPL-SCNC: 128 MMOL/L — LOW (ref 135–145)
SODIUM UR-SCNC: 70 MMOL/L — SIGNIFICANT CHANGE UP
SP GR SPEC: 1.01 — SIGNIFICANT CHANGE UP (ref 1–1.03)
SQUAMOUS # UR AUTO: 2 /HPF — SIGNIFICANT CHANGE UP (ref 0–5)
T4 FREE SERPL-MCNC: 1.37 NG/DL — SIGNIFICANT CHANGE UP (ref 0.93–1.7)
TROPONIN T, HIGH SENSITIVITY RESULT: 21 NG/L — SIGNIFICANT CHANGE UP (ref 0–51)
TROPONIN T, HIGH SENSITIVITY RESULT: 23 NG/L — SIGNIFICANT CHANGE UP (ref 0–51)
TSH SERPL-MCNC: 8.12 UIU/ML — HIGH (ref 0.27–4.2)
UROBILINOGEN FLD QL: 0.2 MG/DL — SIGNIFICANT CHANGE UP (ref 0.2–1)
WBC # BLD: 8.71 K/UL — SIGNIFICANT CHANGE UP (ref 3.8–10.5)
WBC # FLD AUTO: 8.71 K/UL — SIGNIFICANT CHANGE UP (ref 3.8–10.5)
WBC UR QL: 6 /HPF — HIGH (ref 0–5)

## 2024-01-26 PROCEDURE — 99291 CRITICAL CARE FIRST HOUR: CPT

## 2024-01-26 PROCEDURE — 99223 1ST HOSP IP/OBS HIGH 75: CPT

## 2024-01-26 PROCEDURE — 99223 1ST HOSP IP/OBS HIGH 75: CPT | Mod: GC

## 2024-01-26 RX ORDER — DEXTROSE 50 % IN WATER 50 %
25 SYRINGE (ML) INTRAVENOUS ONCE
Refills: 0 | Status: DISCONTINUED | OUTPATIENT
Start: 2024-01-26 | End: 2024-01-29

## 2024-01-26 RX ORDER — METOPROLOL TARTRATE 50 MG
50 TABLET ORAL DAILY
Refills: 0 | Status: DISCONTINUED | OUTPATIENT
Start: 2024-01-26 | End: 2024-01-27

## 2024-01-26 RX ORDER — SODIUM CHLORIDE 9 MG/ML
1000 INJECTION, SOLUTION INTRAVENOUS
Refills: 0 | Status: DISCONTINUED | OUTPATIENT
Start: 2024-01-26 | End: 2024-01-29

## 2024-01-26 RX ORDER — DEXTROSE 50 % IN WATER 50 %
12.5 SYRINGE (ML) INTRAVENOUS ONCE
Refills: 0 | Status: DISCONTINUED | OUTPATIENT
Start: 2024-01-26 | End: 2024-01-29

## 2024-01-26 RX ORDER — DEXTROSE 50 % IN WATER 50 %
15 SYRINGE (ML) INTRAVENOUS ONCE
Refills: 0 | Status: DISCONTINUED | OUTPATIENT
Start: 2024-01-26 | End: 2024-01-29

## 2024-01-26 RX ORDER — IBANDRONATE SODIUM 150 MG/1
1 TABLET ORAL
Refills: 0 | DISCHARGE

## 2024-01-26 RX ORDER — FOLIC ACID 0.8 MG
1 TABLET ORAL DAILY
Refills: 0 | Status: DISCONTINUED | OUTPATIENT
Start: 2024-01-26 | End: 2024-01-26

## 2024-01-26 RX ORDER — INSULIN GLARGINE 100 [IU]/ML
13 INJECTION, SOLUTION SUBCUTANEOUS AT BEDTIME
Refills: 0 | Status: DISCONTINUED | OUTPATIENT
Start: 2024-01-26 | End: 2024-01-28

## 2024-01-26 RX ORDER — SODIUM CHLORIDE 9 MG/ML
1000 INJECTION INTRAMUSCULAR; INTRAVENOUS; SUBCUTANEOUS ONCE
Refills: 0 | Status: COMPLETED | OUTPATIENT
Start: 2024-01-26 | End: 2024-01-26

## 2024-01-26 RX ORDER — AMLODIPINE BESYLATE 2.5 MG/1
10 TABLET ORAL DAILY
Refills: 0 | Status: DISCONTINUED | OUTPATIENT
Start: 2024-01-26 | End: 2024-01-27

## 2024-01-26 RX ORDER — PANTOPRAZOLE SODIUM 20 MG/1
40 TABLET, DELAYED RELEASE ORAL
Refills: 0 | Status: DISCONTINUED | OUTPATIENT
Start: 2024-01-26 | End: 2024-01-29

## 2024-01-26 RX ORDER — METOPROLOL TARTRATE 50 MG
1 TABLET ORAL
Refills: 0 | DISCHARGE

## 2024-01-26 RX ORDER — INSULIN LISPRO 100/ML
5 VIAL (ML) SUBCUTANEOUS ONCE
Refills: 0 | Status: COMPLETED | OUTPATIENT
Start: 2024-01-26 | End: 2024-01-26

## 2024-01-26 RX ORDER — INSULIN LISPRO 100/ML
VIAL (ML) SUBCUTANEOUS
Refills: 0 | Status: DISCONTINUED | OUTPATIENT
Start: 2024-01-26 | End: 2024-01-26

## 2024-01-26 RX ORDER — INSULIN LISPRO 100/ML
VIAL (ML) SUBCUTANEOUS
Refills: 0 | Status: DISCONTINUED | OUTPATIENT
Start: 2024-01-26 | End: 2024-01-29

## 2024-01-26 RX ORDER — ACETAMINOPHEN 500 MG
650 TABLET ORAL EVERY 6 HOURS
Refills: 0 | Status: DISCONTINUED | OUTPATIENT
Start: 2024-01-26 | End: 2024-01-27

## 2024-01-26 RX ORDER — INSULIN LISPRO 100/ML
3 VIAL (ML) SUBCUTANEOUS
Refills: 0 | Status: DISCONTINUED | OUTPATIENT
Start: 2024-01-26 | End: 2024-01-27

## 2024-01-26 RX ORDER — FOLIC ACID 0.8 MG
1 TABLET ORAL
Refills: 0 | DISCHARGE

## 2024-01-26 RX ORDER — ENOXAPARIN SODIUM 100 MG/ML
40 INJECTION SUBCUTANEOUS EVERY 24 HOURS
Refills: 0 | Status: DISCONTINUED | OUTPATIENT
Start: 2024-01-26 | End: 2024-01-29

## 2024-01-26 RX ORDER — AMLODIPINE BESYLATE 2.5 MG/1
1 TABLET ORAL
Refills: 0 | DISCHARGE

## 2024-01-26 RX ORDER — RABEPRAZOLE 20 MG/1
1 TABLET, DELAYED RELEASE ORAL
Refills: 0 | DISCHARGE

## 2024-01-26 RX ORDER — ONDANSETRON 8 MG/1
4 TABLET, FILM COATED ORAL EVERY 8 HOURS
Refills: 0 | Status: DISCONTINUED | OUTPATIENT
Start: 2024-01-26 | End: 2024-01-27

## 2024-01-26 RX ORDER — LANOLIN ALCOHOL/MO/W.PET/CERES
3 CREAM (GRAM) TOPICAL AT BEDTIME
Refills: 0 | Status: DISCONTINUED | OUTPATIENT
Start: 2024-01-26 | End: 2024-01-29

## 2024-01-26 RX ORDER — LEVOTHYROXINE SODIUM 125 MCG
50 TABLET ORAL DAILY
Refills: 0 | Status: DISCONTINUED | OUTPATIENT
Start: 2024-01-26 | End: 2024-01-29

## 2024-01-26 RX ORDER — GLUCAGON INJECTION, SOLUTION 0.5 MG/.1ML
1 INJECTION, SOLUTION SUBCUTANEOUS ONCE
Refills: 0 | Status: DISCONTINUED | OUTPATIENT
Start: 2024-01-26 | End: 2024-01-29

## 2024-01-26 RX ORDER — FOLIC ACID 0.8 MG
1 TABLET ORAL DAILY
Refills: 0 | Status: DISCONTINUED | OUTPATIENT
Start: 2024-01-26 | End: 2024-01-29

## 2024-01-26 RX ADMIN — Medication 50 MILLIGRAM(S): at 18:27

## 2024-01-26 RX ADMIN — INSULIN GLARGINE 13 UNIT(S): 100 INJECTION, SOLUTION SUBCUTANEOUS at 23:14

## 2024-01-26 RX ADMIN — AMLODIPINE BESYLATE 10 MILLIGRAM(S): 2.5 TABLET ORAL at 18:27

## 2024-01-26 RX ADMIN — ENOXAPARIN SODIUM 40 MILLIGRAM(S): 100 INJECTION SUBCUTANEOUS at 16:18

## 2024-01-26 RX ADMIN — Medication 5 UNIT(S): at 13:00

## 2024-01-26 RX ADMIN — Medication 1 MILLIGRAM(S): at 18:29

## 2024-01-26 RX ADMIN — SODIUM CHLORIDE 1000 MILLILITER(S): 9 INJECTION INTRAMUSCULAR; INTRAVENOUS; SUBCUTANEOUS at 10:32

## 2024-01-26 RX ADMIN — Medication 1 MILLIGRAM(S): at 16:18

## 2024-01-26 RX ADMIN — Medication 3 UNIT(S): at 18:18

## 2024-01-26 RX ADMIN — PANTOPRAZOLE SODIUM 40 MILLIGRAM(S): 20 TABLET, DELAYED RELEASE ORAL at 18:28

## 2024-01-26 RX ADMIN — Medication 3: at 18:18

## 2024-01-26 RX ADMIN — Medication 50 MICROGRAM(S): at 18:27

## 2024-01-26 NOTE — H&P ADULT - PROBLEM SELECTOR PLAN 4
Immunotherapy induced T1DM  Home regimen: Insulin novolog 3-4U tid and insulin basaglar 13U qhs.   Per daughter, patient with more frequent episodes of hypoglycemia. This may be in the setting of adrenal insufficiency.     - Ordered premeal 3U tid and lantus 13U with ISS but will adjust based on endocrine recs  - Follow up a1c Immunotherapy induced T1DM  Home regimen: Insulin novolog 3-4U tid and insulin basaglar 13U qhs.   Per daughter, patient with more frequent episodes of hypoglycemia. This may be in the setting of adrenal insufficiency.     - Ordered premeal 3U tid and lantus 13U with JESSICA but will adjust based on endocrine recs  - Follow up a1c

## 2024-01-26 NOTE — ED ADULT NURSE NOTE - OBJECTIVE STATEMENT
pt arrived to the ER c.o a syncopal episode and landing on glutes. Pt reports no head trauma and denies any other medical complaints. Pt is noted to be aox3, able to maintain airway, having nonlabored breathing, no retractions noted, non diaphoretic and able ot talk in clear full sentences. EMS placed IV prior to arrival.

## 2024-01-26 NOTE — H&P ADULT - HISTORY OF PRESENT ILLNESS
HPI obtained from patient and daughter (Kaity) by bedside in [facility] ED    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, managed at [place], s/p chemo until June 2023, now on immunotherapy Durvalumab which was stopped on Dec 6th 2023) - medically history complicated by immunotherapy induced type 1 diabetes, hypothyroidism and plaque psoriasis now presenting after experiencing a syncopal episode.     Patient reports feeling fatigued this morning while making breakfast. She remembers being in a standing position, then feeling lightheaded and losing consciousness. Patient's daughter found her on the ground in a sitting position. Patient denies any CP, palpitations or seizure like symptoms. She had 2 previous falls in the past year.     Patient presented to the ED afebrile, normal HR, hypertensive to 150s with negative orthostatics, satting 95-96% on RA. CBC unremarkable. BMP with hyponatremia 126 and hypochloremia 92. AM cortisol normal at 1 pm. TSH 8.1 with normal free thyroxine. FSG in 230-280s. EKG NSR with LBBB unchanged compared to previous. Endocrinology consulted.      HPI obtained from patient and daughter (Kaity) by bedside in [facility] ED    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, managed at [place], s/p chemo until June 2023, now on immunotherapy Durvalumab which was stopped on Dec 6th 2023) - medically history complicated by immunotherapy induced type 1 diabetes, hypothyroidism and plaque psoriasis now presenting after experiencing a syncopal episode.     Patient reports feeling fatigued this morning while making breakfast. She remembers being in a standing position, then feeling lightheaded and losing consciousness. Patient's daughter found her on the ground in a sitting position. Patient denies any CP, palpitations or seizure like symptoms. She had 2 previous falls in the past year.     Patient presented to the ED afebrile, normal HR, hypertensive to 150s with negative orthostatics, satting 95-96% on RA. CBC unremarkable. BMP with hyponatremia 126 (corrected 132) and hypochloremia 92. AM cortisol normal at 1 pm. TSH 8.1 with normal free thyroxine. FSG in 230-280s. EKG NSR with LBBB unchanged compared to previous. Endocrinology consulted.

## 2024-01-26 NOTE — ED PROVIDER NOTE - OBJECTIVE STATEMENT
89 y/o f with PMH of GB carcinoma treated with chemo at [facility] (last June 2023) and then on immunotherapy (stopped several weeks prior) presents to ED s/p ? syncope episode.  As per daughter, pt was in kitchen making eggs and then she heard a noise and pt was sitting on floor on buttocks against counter awake.  Pt does not remember what happened.  Not sure if she lost consciousness.  In ED pt denies head trauma, bony complaints, chest pain, shortness of breath.  Pt has had some weakness and fatigue.  No fever or chills, nausea, vomiting, or diarrhea.  Pt stopped immunotherapy several weeks prior secondary to developing autoimmune issues such as type 1 DM (now on insulin) and psoriasis.

## 2024-01-26 NOTE — H&P ADULT - PROBLEM SELECTOR PLAN 1
Syncopal episode that lasted a few seconds with no preceding symptoms.   Low concern this is cardiogenic in nature as EKG is unchanged, no arrhythmias seen. No murmurs on exam, could consider echo.   No seizure like activity.   Orthostatics negative. No hypoglycemia on arrival.   Given symptoms of fatigue, weakness and dizziness along with lab findings of hyponatremia and hypochloremia, concern for underlying adrenal insufficiency.     - Follow up endocrine recs for workup of adrenal insufficiency. Syncopal episode that lasted a few seconds with no preceding symptoms.   Low concern this is cardiogenic in nature as EKG is unchanged, no arrhythmias seen. No murmurs on exam, could consider echo.   No seizure like activity.   Orthostatics negative. No hypoglycemia on arrival.   Given symptoms of fatigue, weakness and dizziness along with lab findings of hyponatremia and hypochloremia, concern for underlying adrenal insufficiency.     - Follow up endocrine recs for workup of adrenal insufficiency.  - Obtain collateral from [facility]

## 2024-01-26 NOTE — ED ADULT NURSE REASSESSMENT NOTE - NS ED NURSE REASSESS COMMENT FT1
samy called and updated pt pt's FS. Pt is noted to be aox3, able to maintain airway, having nonlabored breathing, no retractions noted and able to talk in clear full sentences. Pt endorsed to LEAH bell.

## 2024-01-26 NOTE — H&P ADULT - NSHPPHYSICALEXAM_GEN_ALL_CORE
VITAL SIGNS:  T(C): 36.4 (01-26-24 @ 13:53), Max: 36.6 (01-26-24 @ 11:29)  T(F): 97.5 (01-26-24 @ 13:53), Max: 97.8 (01-26-24 @ 11:29)  HR: 80 (01-26-24 @ 13:53) (73 - 80)  BP: 154/66 (01-26-24 @ 13:53) (151/80 - 157/69)  BP(mean): --  RR: 17 (01-26-24 @ 13:53) (16 - 18)  SpO2: 96% (01-26-24 @ 13:53) (95% - 96%)  Wt(kg): --    PHYSICAL EXAM:    Constitutional: NAD  Head: NC/AT  Eyes: PERRL, EOMI, anicteric sclera  ENT: no nasal discharge; uvula midline, no oropharyngeal erythema or exudates; MMM  Neck: supple; no JVD or thyromegaly  Respiratory: CTA B/L; no W/R/R, no retractions  Cardiac: +S1/S2; RRR; no M/R/G; PMI non-displaced  Gastrointestinal: abdomen soft, NT/ND; no rebound or guarding; +BSx4  Extremities: WWP, no clubbing or cyanosis; right leg with mild swelling   Musculoskeletal: NROM x4; no joint swelling, tenderness or erythema  Vascular: 2+ radial, femoral, DP/PT pulses B/L  Dermatologic: plaque psoriasis throughout body   Neurologic: AAOx3; CNII-XII grossly intact; no focal deficits

## 2024-01-26 NOTE — ED ADULT TRIAGE NOTE - CHIEF COMPLAINT QUOTE
Pt BIBEMS from home for syncopal episode, landed on bottom. Denies head strike, dizziness, cp, sob. PMH DM, stomach CA in remission, HTN. f/s 275 with ems.

## 2024-01-26 NOTE — H&P ADULT - NSHPLABSRESULTS_GEN_ALL_CORE
10.7   8.71  )-----------( 167      ( 26 Jan 2024 10:35 )             31.1       01-26    x   |  x   |  18  ----------------------------<  267<H>  5.0   |  22  |  0.83    Ca    9.4      26 Jan 2024 13:16                Urinalysis Basic - ( 26 Jan 2024 13:16 )    Color: x / Appearance: x / SG: x / pH: x  Gluc: 267 mg/dL / Ketone: x  / Bili: x / Urobili: x   Blood: x / Protein: x / Nitrite: x   Leuk Esterase: x / RBC: x / WBC x   Sq Epi: x / Non Sq Epi: x / Bacteria: x                  CAPILLARY BLOOD GLUCOSE      POCT Blood Glucose.: 285 mg/dL (26 Jan 2024 13:48)

## 2024-01-26 NOTE — H&P ADULT - NSICDXPASTMEDICALHX_GEN_ALL_CORE_FT
PAST MEDICAL HISTORY:  Gallbladder cancer     Hypothyroidism     Plaque psoriasis     Type 1 diabetes

## 2024-01-26 NOTE — CONSULT NOTE ADULT - SUBJECTIVE AND OBJECTIVE BOX
HISTORY OF PRESENT ILLNESS  MARU CANTU is a 88y Female with a past medical history of     In ED, pt is afebrile, /69, HR 73  Labs showed Na 126, Cl 92, glucose 237, TSH 8.120    Endocrinology has been consulted for Diabetes Management.    DIABETES HISTORY  - Age at diagnosis:   - Current Therapy:  - History of other regimens:   - History of hypoglycemia:   - History of DKA/HHS:   - Diabetic Complications:   - Home glucose readings:  - Diet:          > Breakfast:         > Lunch:        > Dinner:        > Snacks:  - Physical activity:    - Diabetes managed outpatient by:    FAMILY HISTORY  - Diabetes:  - Thyroid:  - Autoimmune:  - Other:    SOCIAL HISTORY  - Work:  - Alcohol:  - Smoking:  - Recreational Drugs:    ALLERGIES  No Known Allergies      CURRENT MEDICATIONS      REVIEW OF SYSTEMS  Constitutional:  Negative fever, chills or loss of appetite.  Eyes:  Negative blurry vision or double vision.  Cardiovascular:  Negative for chest pain or palpitations.  Respiratory:  Negative for cough, wheezing, or shortness of breath.   Gastrointestinal:  Negative for nausea, vomiting, diarrhea, constipation, or abdominal pain.  Genitourinary:  Negative frequency, urgency or dysuria.  Neurologic:  No headache, confusion, dizziness, lightheadedness.    PHYSICAL EXAM  Vital Signs Last 24 Hrs  T(C): 36.6 (26 Jan 2024 11:29), Max: 36.6 (26 Jan 2024 11:29)  T(F): 97.8 (26 Jan 2024 11:29), Max: 97.8 (26 Jan 2024 11:29)  HR: 73 (26 Jan 2024 11:29) (73 - 76)  BP: 157/69 (26 Jan 2024 11:29) (151/80 - 157/69)  BP(mean): --  RR: 18 (26 Jan 2024 11:29) (16 - 18)  SpO2: 96% (26 Jan 2024 11:29) (95% - 96%)    Parameters below as of 26 Jan 2024 11:29  Patient On (Oxygen Delivery Method): room air    Constitutional: Awake, alert, in no acute distress.   HEENT: Normocephalic, atraumatic, RADHA, no proptosis or lid retraction.   Neck: supple, no acanthosis, no thyromegaly or palpable thyroid nodules.  Respiratory: Lungs clear to ausculation bilaterally.   Cardiovascular: regular rhythm, normal S1 and S2, no audible murmurs.   GI: soft, non-tender, non-distended, bowel sounds present, no masses appreciated.  Extremities: No lower extremity edema, peripheral pulses present.   Skin: no rashes.   Psychiatric: AAO x 3. Normal affect/mood.     LABS  CBC - WBC/HGB/HTC/PLT: 8.71/10.7/31.1/167 (01-26-24)  BMP: Na/K/Cl/Bicarb/BUN/Cr/Gluc: 126/See note/92/23/21/0.89/237 (01-26-24)  Anion Gap: 11 (01-26-24)  eGFR: 63 (01-26-24)  Calcium: 9.3 (01-26-24)  Phosphorus: -- (01-26-24)  Magnesium: -- (01-26-24)      Thyroid Stimulating Hormone, Serum: 8.120 (01-26-24)    CBC - WBC/HGB/HTC/PLT: 8.71/10.7/31.1/167 (01-26-24)BMP: Na/K/Cl/Bicarb/BUN/Cr/Gluc: 126/See note/92/23/21/0.89/237 (01-26-24)  Anion Gap: 11 (01-26-24)  eGFR: 63 (01-26-24)  Calcium: 9.3 (01-26-24)  Phosphorus: -- (01-26-24)  Magnesium: -- (01-26-24)    CAPILLARY BLOOD GLUCOSE & INSULIN RECEIVED  232 mg/dL (01-26 @ 09:43)  231 mg/dL (01-26 @ 12:31)          ASSESSMENT / RECOMMENDATIONS    A1C: BUN: 21  Creatinine: 0.89  GFR: 63  Weight: 53.1  BMI:   EF:     # Type 2 diabetes mellitus with hyperglycemia  - Please keep lantus   units at bedtime.   - Keep lispro   units before each meal.  - Continue lispro moderate dose sliding scale before meals and at bedtime.  - Patient's fingerstick glucose goal is 100-180 mg/dL.    - Discharge recommendations to be discussed.   - Patient can follow up at discharge with [facility] by calling [phone] to make an appointment.      Case discussed with Dr. Vogel. Primary team updated.       Simran Hicks  Endocrinology Fellow    Service Pager: [phone]  HISTORY OF PRESENT ILLNESS  MARU CANTU is a 88y Female with a past medical history of     In ED, pt is afebrile, /69, HR 73  Labs showed Na 126, Cl 92, glucose 237, TSH 8.120    Endocrinology has been consulted for Diabetes Management.    DIABETES HISTORY  - Age at diagnosis:   - Current Therapy:  - History of other regimens:   - History of hypoglycemia:   - History of DKA/HHS:   - Diabetic Complications:   - Home glucose readings:  - Diet:          > Breakfast:         > Lunch:        > Dinner:        > Snacks:  - Physical activity:    - Diabetes managed outpatient by:    FAMILY HISTORY  - Diabetes:  - Thyroid:  - Autoimmune:  - Other:    SOCIAL HISTORY  - Work:  - Alcohol:  - Smoking:  - Recreational Drugs:    ALLERGIES  No Known Allergies      CURRENT MEDICATIONS      REVIEW OF SYSTEMS  Constitutional:  Negative fever, chills or loss of appetite.  Eyes:  Negative blurry vision or double vision.  Cardiovascular:  Negative for chest pain or palpitations.  Respiratory:  Negative for cough, wheezing, or shortness of breath.   Gastrointestinal:  Negative for nausea, vomiting, diarrhea, constipation, or abdominal pain.  Genitourinary:  Negative frequency, urgency or dysuria.  Neurologic:  No headache, confusion, dizziness, lightheadedness.    PHYSICAL EXAM  Vital Signs Last 24 Hrs  T(C): 36.6 (26 Jan 2024 11:29), Max: 36.6 (26 Jan 2024 11:29)  T(F): 97.8 (26 Jan 2024 11:29), Max: 97.8 (26 Jan 2024 11:29)  HR: 73 (26 Jan 2024 11:29) (73 - 76)  BP: 157/69 (26 Jan 2024 11:29) (151/80 - 157/69)  BP(mean): --  RR: 18 (26 Jan 2024 11:29) (16 - 18)  SpO2: 96% (26 Jan 2024 11:29) (95% - 96%)    Parameters below as of 26 Jan 2024 11:29  Patient On (Oxygen Delivery Method): room air    Constitutional: Awake, alert, in no acute distress.   HEENT: Normocephalic, atraumatic, RADHA, no proptosis or lid retraction.   Neck: supple, no acanthosis, no thyromegaly or palpable thyroid nodules.  Respiratory: Lungs clear to ausculation bilaterally.   Cardiovascular: regular rhythm, normal S1 and S2, no audible murmurs.   GI: soft, non-tender, non-distended, bowel sounds present, no masses appreciated.  Extremities: No lower extremity edema, peripheral pulses present.   Skin: no rashes.   Psychiatric: AAO x 3. Normal affect/mood.     LABS  CBC - WBC/HGB/HTC/PLT: 8.71/10.7/31.1/167 (01-26-24)  BMP: Na/K/Cl/Bicarb/BUN/Cr/Gluc: 126/See note/92/23/21/0.89/237 (01-26-24)  Anion Gap: 11 (01-26-24)  eGFR: 63 (01-26-24)  Calcium: 9.3 (01-26-24)  Phosphorus: -- (01-26-24)  Magnesium: -- (01-26-24)      Thyroid Stimulating Hormone, Serum: 8.120 (01-26-24)    CBC - WBC/HGB/HTC/PLT: 8.71/10.7/31.1/167 (01-26-24)BMP: Na/K/Cl/Bicarb/BUN/Cr/Gluc: 126/See note/92/23/21/0.89/237 (01-26-24)  Anion Gap: 11 (01-26-24)  eGFR: 63 (01-26-24)  Calcium: 9.3 (01-26-24)  Phosphorus: -- (01-26-24)  Magnesium: -- (01-26-24)    CAPILLARY BLOOD GLUCOSE & INSULIN RECEIVED  232 mg/dL (01-26 @ 09:43)  231 mg/dL (01-26 @ 12:31)          ASSESSMENT / RECOMMENDATIONS    A1C: BUN: 21  Creatinine: 0.89  GFR: 63  Weight: 53.1  BMI:   EF:     # Type 1 diabetes mellitus with hyperglycemia  - Please keep lantus 13  units at bedtime.   - Keep lispro  3  units before each meal.  - Continue lispro low dose sliding scale before meals and at bedtime.  - Patient's fingerstick glucose goal is 100-180 mg/dL.    - Discharge recommendations to be discussed.   - Patient can follow up at discharge with [facility] by calling [phone] to make an appointment.      # hypothyroidism  - likely from immunotherapy  - recently increased to levothyroxine 50 mcg  - TSH 8.120, Free T4 1.370  - c/w levothyroxine 50 mcg daily in the morning, to be given wiht water 1 hours before meals in the empty stomach    Case discussed with Dr. Vogel. Primary team updated.       Simran Hicks  Endocrinology Fellow    Service Pager: [phone]  HISTORY OF PRESENT ILLNESS  MARU CANTU is a 88y Female with a past medical history of type 1 DM, hypothyroidism, gallbladder cancer s/p chemo and Durvalumab, psoriasis presented to [facility] ED on 01/26 with syncopal episode.  Pt was preparing for breakfast and suddenly felt lightheaded and transiently lost consciousness.  She does not recall events in detail    In ED, pt was afebrile, /69, HR 73.  Labs revealed Na 126, Cl 92, glucose 237, TSH 8.120, free T4 1.370, AM cortisol 16.320.  Urine studies showed Marylu 70, Uosm 270, UCr 25, UA showed 15 ketones.      History was mostly obtained from the daughter at bedside as pt had difficulty hearing without the hearing aids.    Pt lives in [place], originally from [place].  [place] with [place] for the treatement of gallbladder cancer, diagnosed in 2022 Dec.  Been managed non-operatively.  Frist started out with chemo which was stopped in 06/2023 and later added immunotherapy (Durvalumab) which has also been stopped in 12/2023.   Daughter states it is assumed that pt is in remission as last CT scan did not reveal any lesions in the gallbladder??    Despite having frequent glucose >200 on the CMPs throughout her treatment course at [facility], Diabetes was not diagnosed until 01/2024 when the patient and daughter were visiting [place].  Pt reportedly experienced drymouth, polyuria in [place].  She was admitted in the hospital with presumed DKA/HHS? (glucose >500).  C-peptide at that time was 0.3 with glucose 300.  Pt was discharged on insulin.  Pt got back to [place] one week ago and 5 days ago pt went and see endocrinologist  Dr. Pierce from [facility].  Labs were repeated, from the patient [facility] labs cpeptide was <0.20 with glucose >200/  Pt has been kept on Basaglar 13 daily with the aspart 4 with breakfast/3 with lunch/3 with dinner with low dose sliding scale start at 150.       In terms of diet, pt eats [place] diet.  Eats two meals most of the time, for breakfast, eats toast and greek yogurt, eggs and 1/2 orange.  Usually skips lunch.  For dinner, eats early around 3pm, eats protein + vegetable + fruits.  Denies snacking.  Admits that pt does not eat much carbohydrate.    PT has porfirio 3 however during the consult, daughter did not remember the password.  From her memory she states morning glucose and night glucose are in low 200s.  the premeal dinner glucose is 160.   she staes with 3+sldiing scale for dinner, pt tends to go low, lowest in 70s after dinner.    Pt was diagnosed a few months ago with hypothyroidism, which she was originally started on levothyroxine 25mcg daily and increased to 50mcg daily in [place] and pt has been compliant with the medication    Daughter denies pt being on any form of systemic steroids in the last few months.  PT does have psoriasis, so pt takes hydrocortisone 2.5 cream on the face and betasol propionate spray on the body.    Endocrinology has been consulted for Diabetes Management.      ALLERGIES  No Known Allergies      CURRENT MEDICATIONS      REVIEW OF SYSTEMS  Constitutional:  Negative fever, chills or loss of appetite.  Eyes:  Negative blurry vision or double vision.  Cardiovascular:  Negative for chest pain or palpitations.  Respiratory:  Negative for cough, wheezing, or shortness of breath.   Gastrointestinal:  Negative for nausea, vomiting, diarrhea, constipation, or abdominal pain.  Genitourinary:  Negative frequency, urgency or dysuria.  Neurologic:  No headache, confusion, dizziness, lightheadedness.    PHYSICAL EXAM  Vital Signs Last 24 Hrs  T(C): 36.6 (26 Jan 2024 11:29), Max: 36.6 (26 Jan 2024 11:29)  T(F): 97.8 (26 Jan 2024 11:29), Max: 97.8 (26 Jan 2024 11:29)  HR: 73 (26 Jan 2024 11:29) (73 - 76)  BP: 157/69 (26 Jan 2024 11:29) (151/80 - 157/69)  BP(mean): --  RR: 18 (26 Jan 2024 11:29) (16 - 18)  SpO2: 96% (26 Jan 2024 11:29) (95% - 96%)    Parameters below as of 26 Jan 2024 11:29  Patient On (Oxygen Delivery Method): room air    Constitutional: Awake, alert, in no acute distress.   HEENT: Normocephalic, atraumatic, RADHA, no proptosis or lid retraction.   Neck: supple, no acanthosis, no thyromegaly or palpable thyroid nodules.  Respiratory: Lungs clear to ausculation bilaterally.   Cardiovascular: regular rhythm, normal S1 and S2, no audible murmurs.   GI: soft, non-tender, non-distended, bowel sounds present, no masses appreciated.  Extremities: No lower extremity edema, peripheral pulses present.   Skin: + Rash on the back, lower extremities  Psychiatric: AAO x 3. Normal affect/mood.     LABS  CBC - WBC/HGB/HTC/PLT: 8.71/10.7/31.1/167 (01-26-24)  BMP: Na/K/Cl/Bicarb/BUN/Cr/Gluc: 126/See note/92/23/21/0.89/237 (01-26-24)  Anion Gap: 11 (01-26-24)  eGFR: 63 (01-26-24)  Calcium: 9.3 (01-26-24)  Phosphorus: -- (01-26-24)  Magnesium: -- (01-26-24)      Thyroid Stimulating Hormone, Serum: 8.120 (01-26-24)    CBC - WBC/HGB/HTC/PLT: 8.71/10.7/31.1/167 (01-26-24)BMP: Na/K/Cl/Bicarb/BUN/Cr/Gluc: 126/See note/92/23/21/0.89/237 (01-26-24)  Anion Gap: 11 (01-26-24)  eGFR: 63 (01-26-24)  Calcium: 9.3 (01-26-24)  Phosphorus: -- (01-26-24)  Magnesium: -- (01-26-24)    CAPILLARY BLOOD GLUCOSE & INSULIN RECEIVED  232 mg/dL (01-26 @ 09:43)  231 mg/dL (01-26 @ 12:31)          ASSESSMENT / RECOMMENDATIONS    MARU CANUT is a 88y Female with a past medical history of type 1 DM, hypothyroidism, gallbladder cancer s/p chemo and Durvalumab, psoriasis presented to [facility] ED on 01/26 with syncopal episode.    Endocrinology consulted for DM, hypothyroidism and concern for adrenal insufficiency.    A1C: BUN: 21  Creatinine: 0.89  GFR: 63  Weight: 53.1  BMI:       # Type 1 diabetes mellitus with hyperglycemia  - f/u A1c, recheck c-peptide  - Please keep lantus 13  units at bedtime.   - Keep lispro  3  units before each meal.  - encouraged pt to keep some form of carb with every meals  - Continue lispro low dose sliding scale before meals and at bedtime.  - Patient's fingerstick glucose goal is 100-180 mg/dL.    - Discharge recommendations to be discussed.   - Patient can follow up at discharge with [facility] by calling [phone] to make an appointment.      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  Low Uosm likely from having received fluid bolus.  can repeat Uosm  - c/w thyroid replacement  - would look for etiologies that can contribute to SIADH: meds, malignancy etc?  - would consider renal consult for long-term plan:  fluid restriction vs salt tablets?    Case discussed with Dr. Vogel. Primary team updated.       Simran Hicks  Endocrinology Fellow    Service Pager: [phone]

## 2024-01-26 NOTE — ED PROVIDER NOTE - CLINICAL SUMMARY MEDICAL DECISION MAKING FREE TEXT BOX
87 y/o f with PMH of GB carcinoma treated with chemo at [facility] (last June 2023) and then on immunotherapy (stopped several weeks prior) presents to ED s/p ? syncope episode.  As per daughter, pt was in kitchen making eggs and then she heard a noise and pt was sitting on floor on buttocks against counter awake.  Pt does not remember what happened.  Not sure if she lost consciousness.  In ED pt denies head trauma, bony complaints, chest pain, shortness of breath.  Pt has had some weakness and fatigue.  No fever or chills, nausea, vomiting, or diarrhea.  Pt stopped immunotherapy several weeks prior secondary to developing autoimmune issues such as type 1 DM (now on insulin) and psoriasis.    VSS, pt not orthostatic  Workup shows pt with hyponatremia to 126 - sodium workup done  Discussed with Dr. Crane (pt's oncologist - who is possibly concerned for adrenal insufficiency secondary to pt's autoimmune issues from immunotherapy).  Cortisol and ACTH ordered  Endocrine consulted from ED  EKG nonischemic  Doubt cardiac syncope - more likely hyponatremia or bp related  Pt to be admitted to med for r/o adrenal insufficiency and correction of Na.  Pt admitted in stable condition

## 2024-01-26 NOTE — CONSULT NOTE ADULT - ATTENDING COMMENTS
Pt seen on rounds this afternoon while still in the ED awaiting transfer to the floor.  88-yo woman who was brought to the ED today after a syncopal episode at home.  Episode was not witnessed by her daughter (who is with her at the bedside) and the pt has no recollection of any prodromal symptoms.  She recalls being in the kitchen, and then "I was down on the floor."  Her history is significant for CA of the gallbladder which was diagnosed approximately a year ago and which is being treated at [facility]--initially with conventional chemo, more recently with immunotherapy.  She was also diagnosed with hypothyroidism a few months ago, and with DM during a hospitalization in [place] for uncontrolled hyperglycemia (glucose > 600, ?? also with DKA).  (Review of [facility] records, however, seems to indicate elevated glucoses for the past few months)  She was started on insulin in [place], seen for follow-up by an endocrinologist at [facility] on 1/19.  Her insulin regimen was adjusted to 13 units glargine qAM, premeal lispro 4/3/3.    A C-peptide level Pt seen on rounds this afternoon while still in the ED awaiting transfer to the floor.  88-yo woman who was brought to the ED today after a syncopal episode at home.  Episode was not witnessed by her daughter (who is with her at the bedside) and the pt has no recollection of any prodromal symptoms.  She recalls being in the kitchen, and then "I was down on the floor."  Her history is significant for CA of the gallbladder which was diagnosed approximately a year ago and which is being treated at [facility]--initially with conventional chemo, more recently with immunotherapy.  She was also diagnosed with hypothyroidism a few months ago, and with DM during a hospitalization in [place] for uncontrolled hyperglycemia (glucose > 600, ?? also with DKA).  (Review of [facility] records, however, seems to indicate elevated glucoses for the past few months)  She was started on insulin in [place], seen for follow-up by an endocrinologist at [facility] on 1/19.  Her insulin regimen was adjusted to 13 units glargine qAM, premeal lispro 4/3/3.    A C-peptide level done at [facility] was undetectable.  Recent glucoses at home have been in the 200 range In the AM, lower in the PM, and she has a tendency to hypoglycemia after supper  Has lost approximately 10 lb in weight over the past several months.  Admitting labs were significant for a Na of 126, Uosm of 270, glucose 270, HCO3 23, TSH 8.12 uU/ml, FT4 1.37, cortisol 12 mcg/dl  Physical exam was actually most noticeable for numerous psoriatic lesions on her trunk and extremities.  Her thyroid was not enlarged  Impression/Sugg:  --DM--clearly has type 1 disease, almost certainly due to her immunotherapy.  Explained this to the pt and her daughter.  Her weight loss may well be due to uncontrolled hyperglycemia.  Will continue the Lantus at 13 units for now, premeal at 3 units  --Hypothyroidism--also likely due to her immunotherapy.  Will obtain thyroid antibodies.  Is incompletely replaced at this point, but levothyroxine was just increased to 50 mcg/day less than 2 weeks ago--need additional time for equilibration at this dose.  There is no urgency for more aggressive therapy given the free T4 which is in the normal range  --Hyponatremia--etiology unclear.  Urine osmolality is approximately the same as serum.  Likely dilutional, with the pt attempting to excrete a more dilute urine, but hypothyroidism may be playing a role.  Cannot make a diagnosis of SIADH in the setting of hypothyroidism  --Adrenal function appears normal, though she is at risk for adrenal insufficiency in the future from her immunotherapy  --Syncope--does not appear related to hypoglycemia.  Need to consider cardiac cause, consider Cardiology (? EP) consult

## 2024-01-26 NOTE — H&P ADULT - PROBLEM SELECTOR PLAN 6
Patient has been on intermittent courses of topical corticosteroids, topical calcipotriol but no improvement.    - Follow up dermatology recs

## 2024-01-26 NOTE — H&P ADULT - PROBLEM SELECTOR PLAN 2
Diagnosed in Dec 2022, managed at [facility], s/p chemo until June 2023, now on immunotherapy Durvalumab which was stopped on Dec 6th 2023.    - Obtain collateral from patient's oncologist Dr Roel Crane at [facility].

## 2024-01-26 NOTE — H&P ADULT - ASSESSMENT
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, managed at [facility], s/p chemo until June 2023, now on immunotherapy Durvalumab which was stopped on Dec 6th 2023) - medically history complicated by immunotherapy induced type 1 diabetes, hypothyroidism and plaque psoriasis now presenting after experiencing a syncopal episode, admitted to workup adrenal insufficiency

## 2024-01-26 NOTE — ED ADULT NURSE NOTE - NSFALLRISKINTERV_ED_ALL_ED

## 2024-01-26 NOTE — H&P ADULT - PROBLEM SELECTOR PLAN 3
Hyponatremic on arrival. Euvolemic on exam.  Based on urine Na>20 and Urine Osm>100, concern this may be due to SIADH vs hypothyroidism vs adrenal insufficiency.    - Fluid restrict for now and will require further studies such as renin, aldosterone levels. Hyponatremic on arrival. Euvolemic on exam.  Based on urine Na>20 and Urine Osm>100, concern this may be due to SIADH vs hypothyroidism vs adrenal insufficiency.    - Fluid restrict for now

## 2024-01-26 NOTE — H&P ADULT - ATTENDING COMMENTS
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  No tongue biting, tonic clonic movement, confusion, facial droop, weakness or other deficits noted.  Patient lowered herself to her buttocks.      This has happened to her before on a couple of occasions, once with headstrike, and has had cardiac workup within the past year which per daughter was unremarkable.  Admission labs notable for hyperglycemia and hyponatremia. Orthostatic negative, however setting in which the episode at home happened after she had gotten up in the kitchen to make some eggs.  Admitted for further workup/management of syncope and immunotherapy induced type 1 diabetes mellitus.      Physical exam  General: pleasant, no acute distress, sitting up in stretcher  HEENT: NCAT, MMM  Cards: RRR, normal S1S2, soft holosystolic murmur best heard  Pulm: CTAB, no wheeze, crackles, rales or rhonchi  Ab: soft, ntnd  Ext: numerous plaques on legs, RLE swelling and erythema compared to left, wwp  Neuro: alert, follows commands, moves all extremities, no facial asymmetry, speech is fluent, no acute deficits noted  Skin: psoriatic plaques scattered over whole body, no skin tears or any bleeding  Vascular: palpable DP pulses bilaterally    #Syncopal episode  - obtain TTE - has known MR and AR, last echo last year.  Evaluate for progression.  No signs of volume overload.   - EKG at baseline (known LBBB)  - Trop 23-->21, no chest pain or other anginal symptoms  - has had holter monitor and other cardiac workup outpatient per daughter, for prior syncopal episodes  - no neurological deficits, normal neurological exam, can defer any imaging at this time  - orthostatic negative in ED, but can repeat in the morning  - PT consult    #RLE swelling  - lower extremity doppler - patient has active cancer, high risk for thromboembolic event  - no tachycardia, chest pain or other concern for PE at the moment  - if no DVT, would recommend compression stockings to encourage venous return.  Discussed with daughter at patient at bedside    #Immunotherapy induced DM1  - appreciate input from endocrinology  - fingersticks  - insulin per endo    #Hyponatremia  - asymptomatic, when corrected for glucose, is in the low 130s  - potentially SIADH vs adrenal insufficiency  - will fluid restrict and repeat labs in the morning  - creatinine and rest of electrolytes WNL    #Hypothyroidism  - TSH slightly elevated, but T4 normal   - confirm home dose of levothyroxine and continue    #Concern for adrenal insufficiency  - hyponatremia, syncope, high normal K, normocytic anemia (although could be related to malignancy)  - AM cortisol checked in the afternoon, would repeat tomorrow morning    #Gallbladder cancer  - follows with [facility] oncologist - obtain collateral if possible  - outpatient follow up    80 minutes spent on this encounter, including face to face with patient, care coordination and documentation.

## 2024-01-27 DIAGNOSIS — I10 ESSENTIAL (PRIMARY) HYPERTENSION: ICD-10-CM

## 2024-01-27 LAB
A1C WITH ESTIMATED AVERAGE GLUCOSE RESULT: 8.2 % — HIGH (ref 4–5.6)
ALBUMIN SERPL ELPH-MCNC: 3.2 G/DL — LOW (ref 3.3–5)
ALP SERPL-CCNC: 104 U/L — SIGNIFICANT CHANGE UP (ref 40–120)
ALT FLD-CCNC: 13 U/L — SIGNIFICANT CHANGE UP (ref 10–45)
ANION GAP SERPL CALC-SCNC: 5 MMOL/L — SIGNIFICANT CHANGE UP (ref 5–17)
AST SERPL-CCNC: 17 U/L — SIGNIFICANT CHANGE UP (ref 10–40)
BASOPHILS # BLD AUTO: 0.03 K/UL — SIGNIFICANT CHANGE UP (ref 0–0.2)
BASOPHILS NFR BLD AUTO: 0.4 % — SIGNIFICANT CHANGE UP (ref 0–2)
BILIRUB SERPL-MCNC: 0.3 MG/DL — SIGNIFICANT CHANGE UP (ref 0.2–1.2)
BUN SERPL-MCNC: 19 MG/DL — SIGNIFICANT CHANGE UP (ref 7–23)
C PEPTIDE SERPL-MCNC: <0.1 NG/ML — LOW (ref 1.1–4.4)
CALCIUM SERPL-MCNC: 9.7 MG/DL — SIGNIFICANT CHANGE UP (ref 8.4–10.5)
CHLORIDE SERPL-SCNC: 101 MMOL/L — SIGNIFICANT CHANGE UP (ref 96–108)
CO2 SERPL-SCNC: 29 MMOL/L — SIGNIFICANT CHANGE UP (ref 22–31)
CREAT SERPL-MCNC: 0.98 MG/DL — SIGNIFICANT CHANGE UP (ref 0.5–1.3)
CULTURE RESULTS: SIGNIFICANT CHANGE UP
EGFR: 56 ML/MIN/1.73M2 — LOW
EOSINOPHIL # BLD AUTO: 0.3 K/UL — SIGNIFICANT CHANGE UP (ref 0–0.5)
EOSINOPHIL NFR BLD AUTO: 4.1 % — SIGNIFICANT CHANGE UP (ref 0–6)
ESTIMATED AVERAGE GLUCOSE: 189 MG/DL — HIGH (ref 68–114)
GLUCOSE BLDC GLUCOMTR-MCNC: 100 MG/DL — HIGH (ref 70–99)
GLUCOSE BLDC GLUCOMTR-MCNC: 142 MG/DL — HIGH (ref 70–99)
GLUCOSE BLDC GLUCOMTR-MCNC: 151 MG/DL — HIGH (ref 70–99)
GLUCOSE BLDC GLUCOMTR-MCNC: 246 MG/DL — HIGH (ref 70–99)
GLUCOSE BLDC GLUCOMTR-MCNC: 282 MG/DL — HIGH (ref 70–99)
GLUCOSE BLDC GLUCOMTR-MCNC: 84 MG/DL — SIGNIFICANT CHANGE UP (ref 70–99)
GLUCOSE SERPL-MCNC: 141 MG/DL — HIGH (ref 70–99)
HCT VFR BLD CALC: 32.1 % — LOW (ref 34.5–45)
HGB BLD-MCNC: 10.8 G/DL — LOW (ref 11.5–15.5)
IMM GRANULOCYTES NFR BLD AUTO: 0.4 % — SIGNIFICANT CHANGE UP (ref 0–0.9)
LYMPHOCYTES # BLD AUTO: 1.49 K/UL — SIGNIFICANT CHANGE UP (ref 1–3.3)
LYMPHOCYTES # BLD AUTO: 20.2 % — SIGNIFICANT CHANGE UP (ref 13–44)
MAGNESIUM SERPL-MCNC: 2 MG/DL — SIGNIFICANT CHANGE UP (ref 1.6–2.6)
MCHC RBC-ENTMCNC: 30.5 PG — SIGNIFICANT CHANGE UP (ref 27–34)
MCHC RBC-ENTMCNC: 33.6 GM/DL — SIGNIFICANT CHANGE UP (ref 32–36)
MCV RBC AUTO: 90.7 FL — SIGNIFICANT CHANGE UP (ref 80–100)
MONOCYTES # BLD AUTO: 0.65 K/UL — SIGNIFICANT CHANGE UP (ref 0–0.9)
MONOCYTES NFR BLD AUTO: 8.8 % — SIGNIFICANT CHANGE UP (ref 2–14)
NEUTROPHILS # BLD AUTO: 4.86 K/UL — SIGNIFICANT CHANGE UP (ref 1.8–7.4)
NEUTROPHILS NFR BLD AUTO: 66.1 % — SIGNIFICANT CHANGE UP (ref 43–77)
NRBC # BLD: 0 /100 WBCS — SIGNIFICANT CHANGE UP (ref 0–0)
PHOSPHATE SERPL-MCNC: 2.4 MG/DL — LOW (ref 2.5–4.5)
PLATELET # BLD AUTO: 194 K/UL — SIGNIFICANT CHANGE UP (ref 150–400)
POTASSIUM SERPL-MCNC: 3.9 MMOL/L — SIGNIFICANT CHANGE UP (ref 3.5–5.3)
POTASSIUM SERPL-SCNC: 3.9 MMOL/L — SIGNIFICANT CHANGE UP (ref 3.5–5.3)
PROT SERPL-MCNC: 6.8 G/DL — SIGNIFICANT CHANGE UP (ref 6–8.3)
RBC # BLD: 3.54 M/UL — LOW (ref 3.8–5.2)
RBC # FLD: 13.7 % — SIGNIFICANT CHANGE UP (ref 10.3–14.5)
SODIUM SERPL-SCNC: 135 MMOL/L — SIGNIFICANT CHANGE UP (ref 135–145)
SPECIMEN SOURCE: SIGNIFICANT CHANGE UP
THYROGLOB AB FLD IA-ACNC: <20 IU/ML — SIGNIFICANT CHANGE UP
THYROGLOB AB SERPL-ACNC: <20 IU/ML — SIGNIFICANT CHANGE UP
THYROGLOB SERPL-MCNC: 39.7 NG/ML — SIGNIFICANT CHANGE UP (ref 1.6–59.9)
THYROPEROXIDASE AB SERPL-ACNC: <10 IU/ML — SIGNIFICANT CHANGE UP
WBC # BLD: 7.36 K/UL — SIGNIFICANT CHANGE UP (ref 3.8–10.5)
WBC # FLD AUTO: 7.36 K/UL — SIGNIFICANT CHANGE UP (ref 3.8–10.5)

## 2024-01-27 PROCEDURE — 93010 ELECTROCARDIOGRAM REPORT: CPT | Mod: 77

## 2024-01-27 PROCEDURE — 99234 HOSP IP/OBS SM DT SF/LOW 45: CPT | Mod: GC

## 2024-01-27 PROCEDURE — 93970 EXTREMITY STUDY: CPT | Mod: 26

## 2024-01-27 PROCEDURE — 99233 SBSQ HOSP IP/OBS HIGH 50: CPT | Mod: GC

## 2024-01-27 PROCEDURE — 70450 CT HEAD/BRAIN W/O DYE: CPT | Mod: 26

## 2024-01-27 PROCEDURE — 93010 ELECTROCARDIOGRAM REPORT: CPT

## 2024-01-27 RX ORDER — AMLODIPINE BESYLATE 2.5 MG/1
10 TABLET ORAL DAILY
Refills: 0 | Status: DISCONTINUED | OUTPATIENT
Start: 2024-01-27 | End: 2024-01-27

## 2024-01-27 RX ORDER — INSULIN LISPRO 100/ML
5 VIAL (ML) SUBCUTANEOUS
Refills: 0 | Status: DISCONTINUED | OUTPATIENT
Start: 2024-01-27 | End: 2024-01-28

## 2024-01-27 RX ORDER — METOPROLOL TARTRATE 50 MG
50 TABLET ORAL DAILY
Refills: 0 | Status: DISCONTINUED | OUTPATIENT
Start: 2024-01-27 | End: 2024-01-29

## 2024-01-27 RX ORDER — METOPROLOL TARTRATE 50 MG
50 TABLET ORAL DAILY
Refills: 0 | Status: DISCONTINUED | OUTPATIENT
Start: 2024-01-27 | End: 2024-01-27

## 2024-01-27 RX ORDER — AMLODIPINE BESYLATE 2.5 MG/1
10 TABLET ORAL DAILY
Refills: 0 | Status: DISCONTINUED | OUTPATIENT
Start: 2024-01-27 | End: 2024-01-29

## 2024-01-27 RX ADMIN — Medication 3 UNIT(S): at 09:28

## 2024-01-27 RX ADMIN — Medication 2: at 18:13

## 2024-01-27 RX ADMIN — Medication 50 MICROGRAM(S): at 06:10

## 2024-01-27 RX ADMIN — Medication 3: at 14:50

## 2024-01-27 RX ADMIN — PANTOPRAZOLE SODIUM 40 MILLIGRAM(S): 20 TABLET, DELAYED RELEASE ORAL at 06:10

## 2024-01-27 RX ADMIN — ENOXAPARIN SODIUM 40 MILLIGRAM(S): 100 INJECTION SUBCUTANEOUS at 16:13

## 2024-01-27 RX ADMIN — Medication 50 MILLIGRAM(S): at 17:36

## 2024-01-27 RX ADMIN — Medication 3 UNIT(S): at 14:49

## 2024-01-27 RX ADMIN — AMLODIPINE BESYLATE 10 MILLIGRAM(S): 2.5 TABLET ORAL at 17:36

## 2024-01-27 RX ADMIN — Medication 1 MILLIGRAM(S): at 12:05

## 2024-01-27 RX ADMIN — Medication 5 UNIT(S): at 18:13

## 2024-01-27 NOTE — PROGRESS NOTE ADULT - PROBLEM SELECTOR PLAN 4
Immunotherapy induced T1DM  Home regimen: Insulin novolog 3-4U tid and insulin basaglar 13U qhs.   Per daughter, patient with more frequent episodes of hypoglycemia. This may be in the setting of adrenal insufficiency. A1C 8.4    - Ordered premeal 3U tid and lantus 13U with JESSICA but will adjust based on endocrine recs

## 2024-01-27 NOTE — PATIENT PROFILE ADULT - FALL HARM RISK - HARM RISK INTERVENTIONS
Assistance with ambulation/Assistance OOB with selected safe patient handling equipment/Communicate Risk of Fall with Harm to all staff/Monitor for mental status changes/Monitor gait and stability/Reinforce activity limits and safety measures with patient and family/Reorient to person, place and time as needed/Review medications for side effects contributing to fall risk/Sit up slowly, dangle for a short time, stand at bedside before walking/Tailored Fall Risk Interventions/Toileting schedule using arm’s reach rule for commode and bathroom/Use of alarms - bed, chair and/or voice tab/Visual Cue: Yellow wristband and red socks/Bed in lowest position, wheels locked, appropriate side rails in place/Call bell, personal items and telephone in reach/Instruct patient to call for assistance before getting out of bed or chair/Non-slip footwear when patient is out of bed/[place] to call system/Physically safe environment - no spills, clutter or unnecessary equipment/Purposeful Proactive Rounding/Room/bathroom lighting operational, light cord in reach

## 2024-01-27 NOTE — PROGRESS NOTE ADULT - SUBJECTIVE AND OBJECTIVE BOX
SUBJECTIVE/OVERNIGHT EVENTS: No acute overnight events. Pt seen in AM at bedside, resting comfortably in bed, and does not appear to be in any acute distress. When asked, pt denies any recent or active fever, chills, nausea, vomiting, headache, acute sob, chest pain, abdominal pain, genitourinary sx, extremity pain or swelling.    VITAL SIGNS:  Vital Signs Last 24 Hrs  T(C): 36.6 (27 Jan 2024 12:40), Max: 36.9 (27 Jan 2024 06:02)  T(F): 97.9 (27 Jan 2024 12:40), Max: 98.4 (27 Jan 2024 06:02)  HR: 76 (27 Jan 2024 12:40) (72 - 79)  BP: 132/68 (27 Jan 2024 12:40) (122/74 - 162/68)  BP(mean): 99 (26 Jan 2024 19:00) (99 - 99)  RR: 18 (27 Jan 2024 12:40) (16 - 18)  SpO2: 97% (27 Jan 2024 12:40) (95% - 97%)    Parameters below as of 27 Jan 2024 12:40  Patient On (Oxygen Delivery Method): room air      PHYSICAL EXAM:  General: NAD; speaking in full sentences  HEENT: NC/AT; PERRL; EOMI; MMM  Neck: supple; no JVD  Cardiac: RRR; +S1/S2  Pulm: CTA B/L; no W/R/R  GI: soft, NT/ND, +BS  Extremities: WWP; nonpittign edma bilateral legs worse on R. significant erythema and scale over R shin. No clubbing or cyanosis  Vasc: 2+ radial, DP pulses B/L  Neuro: AAOx3; no focal deficits  Skin: Many erythematous, scaly plaques throughout whole body.    MEDICATIONS:  MEDICATIONS  (STANDING):  amLODIPine   Tablet 10 milliGRAM(s) Oral daily  dextrose 5%. 1000 milliLiter(s) (100 mL/Hr) IV Continuous <Continuous>  dextrose 5%. 1000 milliLiter(s) (50 mL/Hr) IV Continuous <Continuous>  dextrose 50% Injectable 12.5 Gram(s) IV Push once  dextrose 50% Injectable 25 Gram(s) IV Push once  dextrose 50% Injectable 25 Gram(s) IV Push once  enoxaparin Injectable 40 milliGRAM(s) SubCutaneous every 24 hours  folic acid 1 milliGRAM(s) Oral daily  glucagon  Injectable 1 milliGRAM(s) IntraMuscular once  insulin glargine Injectable (LANTUS) 13 Unit(s) SubCutaneous at bedtime  insulin lispro (ADMELOG) corrective regimen sliding scale   SubCutaneous Before meals and at bedtime  insulin lispro Injectable (ADMELOG) 3 Unit(s) SubCutaneous three times a day before meals  levothyroxine 50 MICROGram(s) Oral daily  metoprolol succinate ER 50 milliGRAM(s) Oral daily  pantoprazole    Tablet 40 milliGRAM(s) Oral before breakfast    MEDICATIONS  (PRN):  acetaminophen     Tablet .. 650 milliGRAM(s) Oral every 6 hours PRN Temp greater or equal to 38C (100.4F), Mild Pain (1 - 3)  aluminum hydroxide/magnesium hydroxide/simethicone Suspension 30 milliLiter(s) Oral every 4 hours PRN Dyspepsia  dextrose Oral Gel 15 Gram(s) Oral once PRN Blood Glucose LESS THAN 70 milliGRAM(s)/deciliter  melatonin 3 milliGRAM(s) Oral at bedtime PRN Insomnia  ondansetron Injectable 4 milliGRAM(s) IV Push every 8 hours PRN Nausea and/or Vomiting      ALLERGIES:  Allergies    No Known Allergies    Intolerances        LABS:                        10.8   7.36  )-----------( 194      ( 27 Jan 2024 05:30 )             32.1     01-27    135  |  101  |  19  ----------------------------<  141<H>  3.9   |  29  |  0.98    Ca    9.7      27 Jan 2024 05:30  Phos  2.4     01-27  Mg     2.0     01-27    TPro  6.8  /  Alb  3.2<L>  /  TBili  0.3  /  DBili  x   /  AST  17  /  ALT  13  /  AlkPhos  104  01-27        RADIOLOGY & ADDITIONAL TESTS: Reviewed.

## 2024-01-27 NOTE — PHYSICAL THERAPY INITIAL EVALUATION ADULT - ADDITIONAL COMMENTS
pt lives w/ daughter in an elevator access apt building w/ no stairs to enter. Denies use of DME for amb but does own a SC. Denies hx of other recent falls.

## 2024-01-27 NOTE — PROGRESS NOTE ADULT - ATTENDING COMMENTS
Patient was seen and examined at bedside on 1/28/2024 at 1030 am. Patient feels well, is at her baseline. Denies abdominal pain, CP, SOB. ROS is otherwise negative. Vitals, labwork and pertinent imaging reviewed. Physical exam - NAD, AAO x 4, PERRLA, EOMI, supple neck, chest - CTA b/l, CV - rrr, s1s2, no m/r/g, abd - soft, NTND, + BS, ext - wwp, b/l LE edema, psych - normal affect, skin - psoriatic plaques throughout, back - midline, [facility] - full ROM of all joints, psych - normal affect    Plan  -Given history - no prodromal symptoms, orthostatics negative, classic syncope - consult cardiology, patient has had an extensive cardiac syncope workup in the past but despite this there is no clear explanation for her most recent event; patient may need tele monitoring  -Check dopplers   -Endocrinology was consulted and do not suspect adrenal insufficiency Patient was seen and examined at bedside on 1/27/2024 at 1030 am. Patient feels well, is at her baseline. Denies abdominal pain, CP, SOB. ROS is otherwise negative. Vitals, labwork and pertinent imaging reviewed. Physical exam - NAD, AAO x 4, PERRLA, EOMI, supple neck, chest - CTA b/l, CV - rrr, s1s2, no m/r/g, abd - soft, NTND, + BS, ext - wwp, b/l LE edema, psych - normal affect, skin - psoriatic plaques throughout, back - midline, [facility] - full ROM of all joints, psych - normal affect    Plan  -Given history - no prodromal symptoms, orthostatics negative, classic syncope - consult cardiology, patient has had an extensive cardiac syncope workup in the past but despite this there is no clear explanation for her most recent event; patient may need tele monitoring  -Check dopplers   -Endocrinology was consulted and do not suspect adrenal insufficiency

## 2024-01-27 NOTE — PHYSICAL THERAPY INITIAL EVALUATION ADULT - GAIT DEVIATIONS NOTED, PT EVAL
decreased kit/increased time in double stance/decreased step length/decreased weight-shifting ability

## 2024-01-27 NOTE — PROGRESS NOTE ADULT - SUBJECTIVE AND OBJECTIVE BOX
Cardiology Acceptance Note     Hospital Course: 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, managed at [facility], s/p chemo until June 2023, now on immunotherapy Durvalumab which was stopped on Dec 6th 2023) with  immunotherapy induced type 1 diabetes, hypothyroidism and plaque psoriasis who presented to [facility] on 1/26/24 for a syncopal episode. Patient stated that she was at home when suddenly she felt dizzy and had LOC without head strike and without seizure activity. Upon arrival patient was noted to have a Na of 126 and cortisol levels of >10. 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 Patients Na is uptrending to 135 today and is AAxOx 3. Patient was transferred to [facility] for further syncopal workup to rule out cardiac cause   	  MEDICATIONS:  amLODIPine   Tablet 10 milliGRAM(s) Oral daily  metoprolol succinate ER 50 milliGRAM(s) Oral daily        acetaminophen     Tablet .. 650 milliGRAM(s) Oral every 6 hours PRN  melatonin 3 milliGRAM(s) Oral at bedtime PRN  ondansetron Injectable 4 milliGRAM(s) IV Push every 8 hours PRN    aluminum hydroxide/magnesium hydroxide/simethicone Suspension 30 milliLiter(s) Oral every 4 hours PRN  pantoprazole    Tablet 40 milliGRAM(s) Oral before breakfast    dextrose 50% Injectable 12.5 Gram(s) IV Push once  dextrose 50% Injectable 25 Gram(s) IV Push once  dextrose 50% Injectable 25 Gram(s) IV Push once  dextrose Oral Gel 15 Gram(s) Oral once PRN  glucagon  Injectable 1 milliGRAM(s) IntraMuscular once  insulin glargine Injectable (LANTUS) 13 Unit(s) SubCutaneous at bedtime  insulin lispro (ADMELOG) corrective regimen sliding scale   SubCutaneous Before meals and at bedtime  insulin lispro Injectable (ADMELOG) 5 Unit(s) SubCutaneous three times a day before meals  levothyroxine 50 MICROGram(s) Oral daily    dextrose 5%. 1000 milliLiter(s) IV Continuous <Continuous>  dextrose 5%. 1000 milliLiter(s) IV Continuous <Continuous>  enoxaparin Injectable 40 milliGRAM(s) SubCutaneous every 24 hours  folic acid 1 milliGRAM(s) Oral daily    	  VITAL SIGNS:  T(C): 36.6 (01-27-24 @ 12:40), Max: 36.9 (01-27-24 @ 06:02)  HR: 73 (01-27-24 @ 17:28) (72 - 79)  BP: 138/73 (01-27-24 @ 17:28) (122/74 - 162/68)  RR: 18 (01-27-24 @ 12:40) (17 - 18)  SpO2: 97% (01-27-24 @ 12:40) (95% - 97%)  Wt(kg): --    I&O's Summary    Height (cm): 154.9 (01-27 @ 12:40)                                     PHYSICAL EXAM:  Appearance: Normal	  HEENT: Normal oral mucosa, PERRL, EOMI	  Neck: Supple, - JVD;    Cardiovascular: Normal S1 S2, No murmurs  Respiratory: Lungs clear to auscultation /No Rales, Rhonchi, Wheezing	  Gastrointestinal:  Soft, Non-tender, + BS	  Skin:  No ecchymoses, No cyanosis. Plaque soriasis on bilateral lower extremity. R>L   Extremities: Normal range of motion, No clubbing, cyanosis or edema  Vascular: Peripheral pulses palpable 2+ bilaterally  Neurologic: Non-focal  Psychiatry: A & O x 3, Mood & affect appropriate    LABS:	 	             10.8   7.36  )-----------( 194      ( 27 Jan 2024 05:30 )             32.1     01-27    135  |  101  |  19  ----------------------------<  141<H>  3.9   |  29  |  0.98    Ca    9.7      27 Jan 2024 05:30  Phos  2.4     01-27  Mg     2.0     01-27    TPro  6.8  /  Alb  3.2<L>  /  TBili  0.3  /  DBili  x   /  AST  17  /  ALT  13  /  AlkPhos  104  01-27    proBNP:   Lipid Profile:   HgA1c:   TSH:

## 2024-01-27 NOTE — CONSULT NOTE ADULT - ASSESSMENT
I M    88 y o F with HTN, osteoporosis, GERD, gallbladder CA (diagnosed in Dec 2022, managed at [facility], s/p chemo until June 2023, now on immunotherapy Durvalumab which was stopped on Dec 6th 2023) - medically history complicated by immunotherapy induced type 1 diabetes, hypothyroidism and plaque psoriasis now presenting after experiencing a syncopal episode, admitted to workup adrenal insufficiency     Problem/Plan - 1:  ·  Problem: Syncope.   ·  Plan: Syncopal episode that lasted a few seconds with no preceding symptoms.   Low concern this is cardiogenic in nature as EKG is unchanged, no arrhythmias seen. No murmurs on exam, could consider echo.   No seizure like activity.   Orthostatics negative. No hypoglycemia on arrival.   Given symptoms of fatigue, weakness and dizziness along with lab findings of hyponatremia and hypochloremia, concern for underlying adrenal insufficiency.     - Follow up endocrine recs for workup of adrenal insufficiency.  - Obtain collateral from [facility].     Problem/Plan - 2:  ·  Problem: Gallbladder cancer.   ·  Plan: Diagnosed in Dec 2022, managed at [facility], s/p [facility] until June 2023, now on immunotherapy Durvalumab which was stopped on Dec 6th 2023.    - Obtain collateral from patient's oncologist Dr Roel Crane at [facility].     Problem/Plan - 3:  ·  Problem: Hyponatremia.   ·  Plan: Hyponatremic on arrival. Euvolemic on exam.  Based on urine Na>20 and Urine Osm>100, concern this may be due to SIADH vs hypothyroidism vs adrenal insufficiency.    - Fluid restrict for now.     Problem/Plan - 4:  ·  Problem: History of type 1 diabetes mellitus.   ·  Plan: Immunotherapy induced T1DM  Home regimen: Insulin novolog 3-4U tid and insulin basaglar 13U qhs.   Per daughter, patient with more frequent episodes of hypoglycemia. This may be in the setting of adrenal insufficiency.     - Ordered premeal 3U tid and lantus 13U with JESSICA but will adjust based on endocrine recs  - Follow up a1c.     Problem/Plan - 5:  ·  Problem: Hypothyroidism.   ·  Plan: - Continue home synthroid 50 mg qd.     Problem/Plan - 6:  ·  Problem: Plaque psoriasis.   ·  Plan: Patient has been on intermittent courses of topical corticosteroids, topical calcipotriol but no improvement.    - Follow up dermatology recs.     Problem/Plan - 7:  ·  Problem: Prophylactic measure.   ·  Plan: F: restrict  E: replete  Diet: CC  Code: FULL  DVT: lovenox  Dispo: RMF.       Attestation Statements:  I have personally seen and examined the patient.  I fully participated in the care of this patient.  I have made amendments to the documentation where necessary, and agree with the history, physical exam, and plan as documented by the Resident.     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  No tongue biting, tonic clonic movement, confusion, facial droop, weakness or other deficits noted.  Patient lowered herself to her buttocks.      This has happened to her before on a couple of occasions, once with headstrike, and has had cardiac workup within the past year which per daughter was unremarkable.  Admission labs notable for hyperglycemia and hyponatremia. Orthostatic negative, however setting in which the episode at home happened after she had gotten up in the kitchen to make some eggs.  Admitted for further workup/management of syncope and immunotherapy induced type 1 diabetes mellitus.      Physical exam  General: pleasant, no acute distress, sitting up in stretcher  HEENT: NCAT, MMM  Cards: RRR, normal S1S2, soft holosystolic murmur best heard  Pulm: CTAB, no wheeze, crackles, rales or rhonchi  Ab: soft, ntnd  Ext: numerous plaques on legs, RLE swelling and erythema compared to left, wwp  Neuro: alert, follows commands, moves all extremities, no facial asymmetry, speech is fluent, no acute deficits noted  Skin: psoriatic plaques scattered over whole body, no skin tears or any bleeding  Vascular: palpable DP pulses bilaterally    #Syncopal episode  - obtain TTE - has known MR and AR, last echo last year.  Evaluate for progression.  No signs of volume overload.   - EKG at baseline (known LBBB)  - Trop 23-->21, no chest pain or other anginal symptoms  - has had holter monitor and other cardiac workup outpatient per daughter, for prior syncopal episodes  - no neurological deficits, normal neurological exam, can defer any imaging at this time  - orthostatic negative in ED, but can repeat in the morning  - PT consult    #RLE swelling  - lower extremity doppler - patient has active cancer, high risk for thromboembolic event  - no tachycardia, chest pain or other concern for PE at the moment  - if no DVT, would recommend compression stockings to encourage venous return.  Discussed with daughter at patient at bedside    #Immunotherapy induced DM1  - appreciate input from endocrinology  - fingersticks  - insulin per endo    #Hyponatremia  - asymptomatic, when corrected for glucose, is in the low 130s  - potentially SIADH vs adrenal insufficiency  - will fluid restrict and repeat labs in the morning  - creatinine and rest of electrolytes WNL    #Hypothyroidism  - TSH slightly elevated, but T4 normal   - confirm home dose of levothyroxine and continue    #Concern for adrenal insufficiency  - hyponatremia, syncope, high normal K, normocytic anemia (although could be related to malignancy)  - AM cortisol checked in the afternoon, would repeat tomorrow morning    #Gallbladder cancer  - follows with [facility] oncologist - obtain collateral if possible  - outpatient follow up

## 2024-01-27 NOTE — PROGRESS NOTE ADULT - PROBLEM SELECTOR PLAN 4
Hyponatremic on arrival. Euvolemic on exam. TSH wnl. unlikely adrenal insufficiency per endo.  Based on urine Na>20 and Urine Osm>100. 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  Now resolved with fluid restiction    - Endocrine reccs appreciated

## 2024-01-27 NOTE — PROGRESS NOTE ADULT - SUBJECTIVE AND OBJECTIVE BOX
SUBJECTIVE / INTERVAL HPI: Patient was seen and examined this morning.     CAPILLARY BLOOD GLUCOSE & INSULIN RECEIVED  232 mg/dL (01-26 @ 09:43)  231 mg/dL (01-26 @ 12:31)  285 mg/dL (01-26 @ 13:48)  312 mg/dL (01-26 @ 15:22)  276 mg/dL (01-26 @ 18:02)  227 mg/dL (01-26 @ 22:24)  142 mg/dL (01-27 @ 09:18)  282 mg/dL (01-27 @ 14:45)    REVIEW OF SYSTEMS  Constitutional:  Negative fever, chills or loss of appetite.  Eyes:  Negative blurry vision or double vision.  Cardiovascular:  Negative for chest pain or palpitations.  Respiratory:  Negative for cough, wheezing, or shortness of breath.    Gastrointestinal:  Negative for nausea, vomiting, diarrhea, constipation, or abdominal pain.  Genitourinary:  Negative frequency, urgency or dysuria.  Neurologic:  No headache, confusion, dizziness, lightheadedness.    PHYSICAL EXAM  Vital Signs Last 24 Hrs  T(C): 36.6 (27 Jan 2024 12:40), Max: 36.9 (27 Jan 2024 06:02)  T(F): 97.9 (27 Jan 2024 12:40), Max: 98.4 (27 Jan 2024 06:02)  HR: 76 (27 Jan 2024 12:40) (72 - 79)  BP: 132/68 (27 Jan 2024 12:40) (122/74 - 162/68)  BP(mean): 99 (26 Jan 2024 19:00) (99 - 99)  RR: 18 (27 Jan 2024 12:40) (16 - 18)  SpO2: 97% (27 Jan 2024 12:40) (95% - 97%)    Parameters below as of 27 Jan 2024 12:40  Patient On (Oxygen Delivery Method): room air    Constitutional: Awake, alert, in no acute distress.   HEENT: Normocephalic, atraumatic, RADHA.  Respiratory: Lungs clear to ausculation bilaterally.   Cardiovascular: regular rhythm, normal S1 and S2, no audible murmurs.   GI: soft, non-tender, non-distended, bowel sounds present.  Extremities: No lower extremity edema.  Psychiatric: AAO x 3. Normal affect/mood.     LABS  CBC - WBC/HGB/HTC/PLT: 7.36/10.8/32.1/194 (01-27-24)  BMP - Na/K/Cl/Bicarb/BUN/Cr/Gluc/AG/eGFR: 135/3.9/101/29/19/0.98/141/5/56 (01-27-24)  Ca - 9.7 (01-27-24)  Phos - 2.4 (01-27-24)  Mg - 2.0 (01-27-24)  LFT - Alb/Tprot/Tbili/Dbili/AlkPhos/ALT/AST: 3.2/--/0.3/--/104/13/17 (01-27-24)  Thyroid Stimulating Hormone, Serum: 8.120 (01-26-24)  Total T4/Free T4: --/1.370 (01-26-24)    MEDICATIONS  MEDICATIONS  (STANDING):  amLODIPine   Tablet 10 milliGRAM(s) Oral daily  dextrose 5%. 1000 milliLiter(s) (100 mL/Hr) IV Continuous <Continuous>  dextrose 5%. 1000 milliLiter(s) (50 mL/Hr) IV Continuous <Continuous>  dextrose 50% Injectable 12.5 Gram(s) IV Push once  dextrose 50% Injectable 25 Gram(s) IV Push once  dextrose 50% Injectable 25 Gram(s) IV Push once  enoxaparin Injectable 40 milliGRAM(s) SubCutaneous every 24 hours  folic acid 1 milliGRAM(s) Oral daily  glucagon  Injectable 1 milliGRAM(s) IntraMuscular once  insulin glargine Injectable (LANTUS) 13 Unit(s) SubCutaneous at bedtime  insulin lispro (ADMELOG) corrective regimen sliding scale   SubCutaneous Before meals and at bedtime  insulin lispro Injectable (ADMELOG) 3 Unit(s) SubCutaneous three times a day before meals  levothyroxine 50 MICROGram(s) Oral daily  metoprolol succinate ER 50 milliGRAM(s) Oral daily  pantoprazole    Tablet 40 milliGRAM(s) Oral before breakfast    MEDICATIONS  (PRN):  acetaminophen     Tablet .. 650 milliGRAM(s) Oral every 6 hours PRN Temp greater or equal to 38C (100.4F), Mild Pain (1 - 3)  aluminum hydroxide/magnesium hydroxide/simethicone Suspension 30 milliLiter(s) Oral every 4 hours PRN Dyspepsia  dextrose Oral Gel 15 Gram(s) Oral once PRN Blood Glucose LESS THAN 70 milliGRAM(s)/deciliter  melatonin 3 milliGRAM(s) Oral at bedtime PRN Insomnia  ondansetron Injectable 4 milliGRAM(s) IV Push every 8 hours PRN Nausea and/or Vomiting    ASSESSMENT / RECOMMENDATIONS    A1C: 8.2 %  BUN: 19  Creatinine: 0.98  GFR: 56  Weight: 53.1  BMI: 22.1  EF:     # Type 2 diabetes mellitus with hyperglycemia  - Please continue lantus *** units at bedtime.   - Continue lispro *** units before each meal.  - Continue lispro moderate / low dose sliding scale before meals and at bedtime.  - Patient's fingerstick glucose goal is 100-180 mg/dL.    - Discharge recommendations to be discussed.   - Patient can follow up at discharge with [facility] by calling [phone] to make an appointment.      Case discussed with Dr. Bae. Primary team updated.       Dino Schmidt    Endocrinology Fellow    Service Pager: [phone]    SUBJECTIVE / INTERVAL HPI:   The patient was seen and examined this morning. She reports having a good appetite. For dinner, she had chicken, mashed potatoes, and vegetables. For breakfast, she had one slice of toast, two eggs, one sausage, and a fruit cup. Her glucose levels have been mostly above the target range, particularly postprandially. Her glucose dropped overnight from 227 mg/dL to 142 mg/dL, which suggests that her basal requirements are not higher than her current dose of 13 units of Lantus.    The patient was recently diagnosed with type 1 diabetes around three weeks ago. Her daughter showed me labs from [facility], which resulted earlier today showing an undetectable C-peptide <0.02 ng/dL and undetectable CHARLY and IA-2 antibodies. She states that three weeks ago, the patient's C-peptide was also undetectable. The patient and her daughter have been trying their best to manage the disease, but her glucose levels at home have not been optimal. Data from her Freestyle Miguel 3 sensor was reviewed, showing that her glucose levels have been mainly in the 200s mg/dL range for the past few weeks.    The patient reports having a completely different eating schedule at home with fewer carbohydrates, particularly at dinner time. She usually starts her day around 9:00-9:30 AM when she has breakfast, consisting of one slice of toast with Greek yogurt, sugar-free marmalade, one cup of 2% milk, one egg, and half an orange. Her glucose before breakfast is typically around 250 mg/dL. She skips lunch, and the next time she checks her glucose is before dinner, around 4:30 PM - 6:00 PM, when her numbers are usually around 220 mg/dL. She usually has protein (e.g., chicken, lamb, or fish) with vegetables (e.g., asparagus) and fruit (e.g., papaya) for dinner. The patient's daughter noticed that the patient was experiencing hypoglycemia <70 mg/dL for two nights in a row (around 8:00 - 9:00 PM). Therefore, she decreased the dose of the short-acting insulin to 3 units, which resulted in postprandial levels of around 140 - 150 mg/dL.    We had an extensive discussion about the possible cause of the patient's diabetes and the importance of continuing insulin therapy given absent C-peptide levels. Furthermore, I explained to her that her insulin regimen might need to be adjusted upon discharge, given that her diet schedule is different at home. We also discussed considering increasing the amounts of carbohydrates in the diet if weight gain is a priority.     CAPILLARY BLOOD GLUCOSE & INSULIN RECEIVED  231 mg/dL (01-26 @ 12:31) -> 5 units of lispro.    276 mg/dL (01-26 @ 18:02) -> 3 units of lispro + 3 units of lispro sliding scale.    227 mg/dL (01-26 @ 22:24) -> 13 units of lantus.    142 mg/dL (01-27 @ 09:18) -> 3 units of lispro.    282 mg/dL (01-27 @ 14:45)    REVIEW OF SYSTEMS  Constitutional:  Negative fever, chills or loss of appetite.  Cardiovascular:  Negative for chest pain or palpitations.  Respiratory:  Negative for cough, wheezing, or shortness of breath.    Gastrointestinal:  Negative for nausea, vomiting, diarrhea, constipation, or abdominal pain.  Neurologic:  No headache, confusion, dizziness, lightheadedness.    PHYSICAL EXAM  Vital Signs Last 24 Hrs  T(C): 36.6 (27 Jan 2024 12:40), Max: 36.9 (27 Jan 2024 06:02)  T(F): 97.9 (27 Jan 2024 12:40), Max: 98.4 (27 Jan 2024 06:02)  HR: 76 (27 Jan 2024 12:40) (72 - 79)  BP: 132/68 (27 Jan 2024 12:40) (122/74 - 162/68)  BP(mean): 99 (26 Jan 2024 19:00) (99 - 99)  RR: 18 (27 Jan 2024 12:40) (16 - 18)  SpO2: 97% (27 Jan 2024 12:40) (95% - 97%)    Parameters below as of 27 Jan 2024 12:40  Patient On (Oxygen Delivery Method): room air    Constitutional: Awake, alert, elderly female, in no acute distress.   HEENT: Normocephalic, atraumatic, RADHA.  Respiratory: Lungs clear to ausculation bilaterally.   Cardiovascular: regular rhythm, normal S1 and S2, no audible murmurs.   GI: soft, non-tender, non-distended, bowel sounds present.  Extremities: No lower extremity edema.  Psychiatric: AAO x 3.     LABS  CBC - WBC/HGB/HTC/PLT: 7.36/10.8/32.1/194 (01-27-24)  BMP - Na/K/Cl/Bicarb/BUN/Cr/Gluc/AG/eGFR: 135/3.9/101/29/19/0.98/141/5/56 (01-27-24)  Ca - 9.7 (01-27-24)  Phos - 2.4 (01-27-24)  Mg - 2.0 (01-27-24)  LFT - Alb/Tprot/Tbili/Dbili/AlkPhos/ALT/AST: 3.2/--/0.3/--/104/13/17 (01-27-24)  Thyroid Stimulating Hormone, Serum: 8.120 (01-26-24)  Total T4/Free T4: --/1.370 (01-26-24)    MEDICATIONS  MEDICATIONS  (STANDING):  amLODIPine   Tablet 10 milliGRAM(s) Oral daily  dextrose 5%. 1000 milliLiter(s) (100 mL/Hr) IV Continuous <Continuous>  dextrose 5%. 1000 milliLiter(s) (50 mL/Hr) IV Continuous <Continuous>  dextrose 50% Injectable 12.5 Gram(s) IV Push once  dextrose 50% Injectable 25 Gram(s) IV Push once  dextrose 50% Injectable 25 Gram(s) IV Push once  enoxaparin Injectable 40 milliGRAM(s) SubCutaneous every 24 hours  folic acid 1 milliGRAM(s) Oral daily  glucagon  Injectable 1 milliGRAM(s) IntraMuscular once  insulin glargine Injectable (LANTUS) 13 Unit(s) SubCutaneous at bedtime  insulin lispro (ADMELOG) corrective regimen sliding scale   SubCutaneous Before meals and at bedtime  insulin lispro Injectable (ADMELOG) 3 Unit(s) SubCutaneous three times a day before meals  levothyroxine 50 MICROGram(s) Oral daily  metoprolol succinate ER 50 milliGRAM(s) Oral daily  pantoprazole    Tablet 40 milliGRAM(s) Oral before breakfast    MEDICATIONS  (PRN):  acetaminophen     Tablet .. 650 milliGRAM(s) Oral every 6 hours PRN Temp greater or equal to 38C (100.4F), Mild Pain (1 - 3)  aluminum hydroxide/magnesium hydroxide/simethicone Suspension 30 milliLiter(s) Oral every 4 hours PRN Dyspepsia  dextrose Oral Gel 15 Gram(s) Oral once PRN Blood Glucose LESS THAN 70 milliGRAM(s)/deciliter  melatonin 3 milliGRAM(s) Oral at bedtime PRN Insomnia  ondansetron Injectable 4 milliGRAM(s) IV Push every 8 hours PRN Nausea and/or Vomiting    ASSESSMENT / RECOMMENDATIONS  Ms. Kramer is a 88-year-old woman with recently diagnosed type 1 diabetes mellitus, hypothyroidism and gallbladder cancer (s/p chemo and Durvalumab) and psoriasis who presented to [facility] emergency department (01/26/24) with syncopal episode. Endocrinology was consulted for recommendations regarding management of diabetes and hypothyroidism, also given concern for adrenal insufficiency.    A1C: 8.2 %  BUN: 19  Creatinine: 0.98  GFR: 56  Weight: 53.1  BMI: 22.1    # Type 1 diabetes mellitus with hyperglycemia  - Patient's daughter showed me undetectable C-peptide from recent testing. Follow C-peptide in process.   - Please keep lantus 13  units at bedtime.   - INCREASE lispro to 5 units before each meal given postprandial hyperglycemia.   - Encouraged patient to keep some form of carb with every meal.  - Continue lispro low dose sliding scale before meals and at bedtime.  - Patient's fingerstick glucose goal is 100-180 mg/dL.    - Discharge recommendations to be discussed.   - Patient can follow up at discharge with [facility] by calling [phone] to make an appointment.      # Hypothyroidism  - Likely from immunotherapy  - recently increased to levothyroxine 50 mcg  - TSH 8.120, Free T4 1.370  - Continue with levothyroxine 50 mcg daily in the morning, to be given with only with water, 1 hour before breakfast, on an empty stomach.    # Concern for adrenal insufficiency  - Cortisol levels been repeatedly >10, recent one here at 16.320  - She has been normotensive.  - No convincing evidence of adrenal insufficiency  - Keep off steroids  - Would look for alternate causes of syncopal episode    Case discussed with Dr. Bae. Primary team updated.       Dino Schmidt    Endocrinology Fellow    Service Pager: [phone]

## 2024-01-27 NOTE — PHYSICAL THERAPY INITIAL EVALUATION ADULT - PERTINENT HX OF CURRENT PROBLEM, REHAB EVAL
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, managed at [facility], s/p chemo until June 2023, now on immunotherapy Durvalumab which was stopped on Dec 6th 2023) - medically history complicated by immunotherapy induced type 1 diabetes, hypothyroidism and plaque psoriasis now presenting after experiencing a syncopal episode, admitted to workup adrenal insufficiency

## 2024-01-27 NOTE — PROGRESS NOTE ADULT - ATTENDING COMMENTS
88yoF h/o insulin deficient DM likely due to being on Durvalumab (h/o gallbladder CA), hypothyroidism, admitted 01/26 for syncope.    #Type 1 diabetes mellitus: Per daughter's account, she's eating more carbs here than at home.   -Agree with lantus 13 units at bedtime and increasing Lispro to 5u with meals.   -counseling provided to the daughter and her patient about glycemic goals - would err on the side of hyperglycemia at home to avoid hypoglycemia, especially as she is getting used to glucose management.      #Hypothyroidism: Agree with continuing levothyroxine 50 mcg    Maurilio Bae MD  Attending Endocrinologist

## 2024-01-27 NOTE — CONSULT NOTE ADULT - SUBJECTIVE AND OBJECTIVE BOX
Patient is a 88y old  Female who presents with a chief complaint of Syncope (26 Jan 2024 14:03)      HPI:  HPI obtained from patient and daughter (Kaity) by bedside in [facility] ED    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, managed at [facility], s/p chemo until June 2023, now on immunotherapy Durvalumab which was stopped on Dec 6th 2023) - medically history complicated by immunotherapy induced type 1 diabetes, hypothyroidism and plaque psoriasis now presenting after experiencing a syncopal episode.     Patient reports feeling fatigued this morning while making breakfast. She remembers being in a standing position, then feeling lightheaded and losing consciousness. Patient's daughter found her on the ground in a sitting position. Patient denies any CP, palpitations or seizure like symptoms. She had 2 previous falls in the past year.     Patient presented to the ED afebrile, normal HR, hypertensive to 150s with negative orthostatics, satting 95-96% on RA. CBC unremarkable. BMP with hyponatremia 126 (corrected 132) and hypochloremia 92. AM cortisol normal at 1 pm. TSH 8.1 with normal free thyroxine. FSG in 230-280s. EKG NSR with LBBB unchanged compared to previous. Endocrinology consulted.  (26 Jan 2024 14:03)    PAST MEDICAL & SURGICAL HISTORY:  Gallbladder cancer      Type 1 diabetes      Plaque psoriasis      Hypothyroidism      No significant past surgical history        MEDICATIONS  (STANDING):  amLODIPine   Tablet 10 milliGRAM(s) Oral daily  dextrose 5%. 1000 milliLiter(s) (100 mL/Hr) IV Continuous <Continuous>  dextrose 5%. 1000 milliLiter(s) (50 mL/Hr) IV Continuous <Continuous>  dextrose 50% Injectable 12.5 Gram(s) IV Push once  dextrose 50% Injectable 25 Gram(s) IV Push once  dextrose 50% Injectable 25 Gram(s) IV Push once  enoxaparin Injectable 40 milliGRAM(s) SubCutaneous every 24 hours  folic acid 1 milliGRAM(s) Oral daily  glucagon  Injectable 1 milliGRAM(s) IntraMuscular once  insulin glargine Injectable (LANTUS) 13 Unit(s) SubCutaneous at bedtime  insulin lispro (ADMELOG) corrective regimen sliding scale   SubCutaneous Before meals and at bedtime  insulin lispro Injectable (ADMELOG) 3 Unit(s) SubCutaneous three times a day before meals  levothyroxine 50 MICROGram(s) Oral daily  metoprolol succinate ER 50 milliGRAM(s) Oral daily  pantoprazole    Tablet 40 milliGRAM(s) Oral before breakfast    MEDICATIONS  (PRN):  acetaminophen     Tablet .. 650 milliGRAM(s) Oral every 6 hours PRN Temp greater or equal to 38C (100.4F), Mild Pain (1 - 3)  aluminum hydroxide/magnesium hydroxide/simethicone Suspension 30 milliLiter(s) Oral every 4 hours PRN Dyspepsia  dextrose Oral Gel 15 Gram(s) Oral once PRN Blood Glucose LESS THAN 70 milliGRAM(s)/deciliter  melatonin 3 milliGRAM(s) Oral at bedtime PRN Insomnia  ondansetron Injectable 4 milliGRAM(s) IV Push every 8 hours PRN Nausea and/or Vomiting            FAMILY HISTORY:  No pertinent family history in first degree relatives        CBC Full  -  ( 27 Jan 2024 05:30 )  WBC Count : 7.36 K/uL  RBC Count : 3.54 M/uL  Hemoglobin : 10.8 g/dL  Hematocrit : 32.1 %  Platelet Count - Automated : 194 K/uL  Mean Cell Volume : 90.7 fl  Mean Cell Hemoglobin : 30.5 pg  Mean Cell Hemoglobin Concentration : 33.6 gm/dL  Auto Neutrophil # : 4.86 K/uL  Auto Lymphocyte # : 1.49 K/uL  Auto Monocyte # : 0.65 K/uL  Auto Eosinophil # : 0.30 K/uL  Auto Basophil # : 0.03 K/uL  Auto Neutrophil % : 66.1 %  Auto Lymphocyte % : 20.2 %  Auto Monocyte % : 8.8 %  Auto Eosinophil % : 4.1 %  Auto Basophil % : 0.4 %      01-27    135  |  101  |  19  ----------------------------<  141<H>  3.9   |  29  |  0.98    Ca    9.7      27 Jan 2024 05:30  Phos  2.4     01-27  Mg     2.0     01-27    TPro  6.8  /  Alb  3.2<L>  /  TBili  0.3  /  DBili  x   /  AST  17  /  ALT  13  /  AlkPhos  104  01-27      Urinalysis Basic - ( 27 Jan 2024 05:30 )    Color: x / Appearance: x / SG: x / pH: x  Gluc: 141 mg/dL / Ketone: x  / Bili: x / Urobili: x   Blood: x / Protein: x / Nitrite: x   Leuk Esterase: x / RBC: x / WBC x   Sq Epi: x / Non Sq Epi: x / Bacteria: x        Radiology :             Review of Systems : per HPI         Vital Signs Last 24 Hrs  T(C): 36.9 (27 Jan 2024 06:02), Max: 36.9 (27 Jan 2024 06:02)  T(F): 98.4 (27 Jan 2024 06:02), Max: 98.4 (27 Jan 2024 06:02)  HR: 72 (27 Jan 2024 06:02) (72 - 80)  BP: 122/74 (27 Jan 2024 06:02) (122/74 - 162/68)  BP(mean): 99 (26 Jan 2024 19:00) (99 - 99)  RR: 17 (27 Jan 2024 06:02) (16 - 18)  SpO2: 96% (27 Jan 2024 06:02) (95% - 97%)    Parameters below as of 27 Jan 2024 06:02  Patient On (Oxygen Delivery Method): room air            Physical Exam :  88 y o woman lying comfortably in semi Hinojosa's position , awake , alert , no acute complaints     Head : normocephalic , atraumatic    Eyes : PERRLA , EOMI , no nystagmus , sclera anicteric    ENT / FACE : neg nasal discharge , uvula midline , no oropharyngeal erythema / exudate    Neck : supple , negative JVD , negative carotid bruits , no thyromegaly    Chest : CTA bilaterally , neg wheeze / rhonchi / rales / crackles / egophany    Cardiovascular : regular rate and rhythm , neg murmurs / rubs / gallops    Abdomen : soft , non distended , non tender to palpation in all 4 quadrants ,  normal bowel sounds     Extremities : LE plaques     Neurologic Exam :     Alert and oriented to person , place , date/year , speech fluent w/o dysarthria     Cranial Nerves:           II :                         pupils equal , round and reactive to light , visual fields intact         III/ IV/VI :              extraocular movements intact , neg nystagmus , neg ptosis        V :                        facial sensation intact , V1-3 normal        VII :                      face symmetric , no droop , normal eye closure and smile        VIII :                     hearing intact to finger rub bilaterally        IX and X :             no hoarseness , gag intact , palate/ uvula rise symmetrically        XI :                       SCM / trapezius strength intact bilateral        XII :                      no tongue deviation       Motor Exam:                Right UE:                     no focal weakness ,  > 3+/5 throughout  , no pronator drift     Left UE:                       no focal weakness ,  > 3+/5 throughout  , no pronator drift         Right LE:          no focal weakness ,  > 3+/5 throughout        Left LE:          no focal weakness ,  > 3+/5 throughout         Sensation :         intact to light touch x 4 extremities                            no neglect or extinction on double simultaneous testing    DTR :           biceps/brachioradialis : equal                            patella/ankle : equal           neg Babinski         Coordination :            Finger to Nose :  neg dysmetria bilaterally        Gait :  not tested          PM&R Impression : admitted for syncope     - deconditioned    - no focal neurologic deficits         Recommendations / Plan :       1) Physical / Occupational therapy focusing on therapeutic exercises , equipment evaluation , bed mobility/transfer out of bed evaluation , progressive ambulation with assistive devices prn .    2) Current disposition plan recommendation  :    pending functional progress       none

## 2024-01-28 DIAGNOSIS — M79.89 OTHER SPECIFIED SOFT TISSUE DISORDERS: ICD-10-CM

## 2024-01-28 LAB
ALBUMIN SERPL ELPH-MCNC: 3.4 G/DL — SIGNIFICANT CHANGE UP (ref 3.3–5)
ALP SERPL-CCNC: 113 U/L — SIGNIFICANT CHANGE UP (ref 40–120)
ALT FLD-CCNC: 13 U/L — SIGNIFICANT CHANGE UP (ref 10–45)
ANION GAP SERPL CALC-SCNC: 8 MMOL/L — SIGNIFICANT CHANGE UP (ref 5–17)
AST SERPL-CCNC: 18 U/L — SIGNIFICANT CHANGE UP (ref 10–40)
BASOPHILS # BLD AUTO: 0.03 K/UL — SIGNIFICANT CHANGE UP (ref 0–0.2)
BASOPHILS NFR BLD AUTO: 0.3 % — SIGNIFICANT CHANGE UP (ref 0–2)
BILIRUB SERPL-MCNC: 0.3 MG/DL — SIGNIFICANT CHANGE UP (ref 0.2–1.2)
BUN SERPL-MCNC: 25 MG/DL — HIGH (ref 7–23)
CALCIUM SERPL-MCNC: 9.8 MG/DL — SIGNIFICANT CHANGE UP (ref 8.4–10.5)
CHLORIDE SERPL-SCNC: 97 MMOL/L — SIGNIFICANT CHANGE UP (ref 96–108)
CO2 SERPL-SCNC: 27 MMOL/L — SIGNIFICANT CHANGE UP (ref 22–31)
CREAT SERPL-MCNC: 1.03 MG/DL — SIGNIFICANT CHANGE UP (ref 0.5–1.3)
EGFR: 53 ML/MIN/1.73M2 — LOW
EOSINOPHIL # BLD AUTO: 0.32 K/UL — SIGNIFICANT CHANGE UP (ref 0–0.5)
EOSINOPHIL NFR BLD AUTO: 3.5 % — SIGNIFICANT CHANGE UP (ref 0–6)
GLUCOSE BLDC GLUCOMTR-MCNC: 138 MG/DL — HIGH (ref 70–99)
GLUCOSE BLDC GLUCOMTR-MCNC: 211 MG/DL — HIGH (ref 70–99)
GLUCOSE BLDC GLUCOMTR-MCNC: 257 MG/DL — HIGH (ref 70–99)
GLUCOSE BLDC GLUCOMTR-MCNC: 272 MG/DL — HIGH (ref 70–99)
GLUCOSE BLDC GLUCOMTR-MCNC: 278 MG/DL — HIGH (ref 70–99)
GLUCOSE BLDC GLUCOMTR-MCNC: 294 MG/DL — HIGH (ref 70–99)
GLUCOSE SERPL-MCNC: 321 MG/DL — HIGH (ref 70–99)
HCT VFR BLD CALC: 33.3 % — LOW (ref 34.5–45)
HGB BLD-MCNC: 11.2 G/DL — LOW (ref 11.5–15.5)
IMM GRANULOCYTES NFR BLD AUTO: 0.6 % — SIGNIFICANT CHANGE UP (ref 0–0.9)
LYMPHOCYTES # BLD AUTO: 1.44 K/UL — SIGNIFICANT CHANGE UP (ref 1–3.3)
LYMPHOCYTES # BLD AUTO: 15.6 % — SIGNIFICANT CHANGE UP (ref 13–44)
MAGNESIUM SERPL-MCNC: 1.7 MG/DL — SIGNIFICANT CHANGE UP (ref 1.6–2.6)
MCHC RBC-ENTMCNC: 30.4 PG — SIGNIFICANT CHANGE UP (ref 27–34)
MCHC RBC-ENTMCNC: 33.6 GM/DL — SIGNIFICANT CHANGE UP (ref 32–36)
MCV RBC AUTO: 90.2 FL — SIGNIFICANT CHANGE UP (ref 80–100)
MONOCYTES # BLD AUTO: 0.73 K/UL — SIGNIFICANT CHANGE UP (ref 0–0.9)
MONOCYTES NFR BLD AUTO: 7.9 % — SIGNIFICANT CHANGE UP (ref 2–14)
NEUTROPHILS # BLD AUTO: 6.66 K/UL — SIGNIFICANT CHANGE UP (ref 1.8–7.4)
NEUTROPHILS NFR BLD AUTO: 72.1 % — SIGNIFICANT CHANGE UP (ref 43–77)
NRBC # BLD: 0 /100 WBCS — SIGNIFICANT CHANGE UP (ref 0–0)
PHOSPHATE SERPL-MCNC: 2.3 MG/DL — LOW (ref 2.5–4.5)
PLATELET # BLD AUTO: 197 K/UL — SIGNIFICANT CHANGE UP (ref 150–400)
POTASSIUM SERPL-MCNC: 4.3 MMOL/L — SIGNIFICANT CHANGE UP (ref 3.5–5.3)
POTASSIUM SERPL-SCNC: 4.3 MMOL/L — SIGNIFICANT CHANGE UP (ref 3.5–5.3)
PROT SERPL-MCNC: 7.1 G/DL — SIGNIFICANT CHANGE UP (ref 6–8.3)
RBC # BLD: 3.69 M/UL — LOW (ref 3.8–5.2)
RBC # FLD: 13.7 % — SIGNIFICANT CHANGE UP (ref 10.3–14.5)
SODIUM SERPL-SCNC: 132 MMOL/L — LOW (ref 135–145)
WBC # BLD: 9.24 K/UL — SIGNIFICANT CHANGE UP (ref 3.8–10.5)
WBC # FLD AUTO: 9.24 K/UL — SIGNIFICANT CHANGE UP (ref 3.8–10.5)

## 2024-01-28 PROCEDURE — 99232 SBSQ HOSP IP/OBS MODERATE 35: CPT

## 2024-01-28 PROCEDURE — 93010 ELECTROCARDIOGRAM REPORT: CPT

## 2024-01-28 PROCEDURE — 99233 SBSQ HOSP IP/OBS HIGH 50: CPT | Mod: GC

## 2024-01-28 PROCEDURE — 99233 SBSQ HOSP IP/OBS HIGH 50: CPT

## 2024-01-28 RX ORDER — PETROLATUM,WHITE
1 JELLY (GRAM) TOPICAL
Refills: 0 | Status: DISCONTINUED | OUTPATIENT
Start: 2024-01-28 | End: 2024-01-29

## 2024-01-28 RX ORDER — INSULIN GLARGINE 100 [IU]/ML
6 INJECTION, SOLUTION SUBCUTANEOUS ONCE
Refills: 0 | Status: COMPLETED | OUTPATIENT
Start: 2024-01-28 | End: 2024-01-28

## 2024-01-28 RX ORDER — INSULIN LISPRO 100/ML
5 VIAL (ML) SUBCUTANEOUS
Refills: 0 | Status: DISCONTINUED | OUTPATIENT
Start: 2024-01-28 | End: 2024-01-29

## 2024-01-28 RX ORDER — MAGNESIUM OXIDE 400 MG ORAL TABLET 241.3 MG
400 TABLET ORAL ONCE
Refills: 0 | Status: COMPLETED | OUTPATIENT
Start: 2024-01-28 | End: 2024-01-28

## 2024-01-28 RX ORDER — BACITRACIN ZINC 500 UNIT/G
1 OINTMENT IN PACKET (EA) TOPICAL DAILY
Refills: 0 | Status: DISCONTINUED | OUTPATIENT
Start: 2024-01-28 | End: 2024-01-29

## 2024-01-28 RX ORDER — INSULIN LISPRO 100/ML
3 VIAL (ML) SUBCUTANEOUS
Refills: 0 | Status: DISCONTINUED | OUTPATIENT
Start: 2024-01-28 | End: 2024-01-29

## 2024-01-28 RX ORDER — MAGNESIUM SULFATE 500 MG/ML
1 VIAL (ML) INJECTION ONCE
Refills: 0 | Status: COMPLETED | OUTPATIENT
Start: 2024-01-28 | End: 2024-01-28

## 2024-01-28 RX ORDER — POLYETHYLENE GLYCOL 3350 17 G/17G
17 POWDER, FOR SOLUTION ORAL DAILY
Refills: 0 | Status: DISCONTINUED | OUTPATIENT
Start: 2024-01-28 | End: 2024-01-29

## 2024-01-28 RX ORDER — INSULIN LISPRO 100/ML
5 VIAL (ML) SUBCUTANEOUS
Refills: 0 | Status: DISCONTINUED | OUTPATIENT
Start: 2024-01-29 | End: 2024-01-29

## 2024-01-28 RX ORDER — INSULIN GLARGINE 100 [IU]/ML
12 INJECTION, SOLUTION SUBCUTANEOUS AT BEDTIME
Refills: 0 | Status: DISCONTINUED | OUTPATIENT
Start: 2024-01-28 | End: 2024-01-29

## 2024-01-28 RX ORDER — INSULIN GLARGINE 100 [IU]/ML
4 INJECTION, SOLUTION SUBCUTANEOUS ONCE
Refills: 0 | Status: COMPLETED | OUTPATIENT
Start: 2024-01-28 | End: 2024-01-28

## 2024-01-28 RX ADMIN — Medication 100 GRAM(S): at 15:40

## 2024-01-28 RX ADMIN — Medication 5 UNIT(S): at 09:14

## 2024-01-28 RX ADMIN — INSULIN GLARGINE 4 UNIT(S): 100 INJECTION, SOLUTION SUBCUTANEOUS at 03:43

## 2024-01-28 RX ADMIN — Medication 50 MILLIGRAM(S): at 06:01

## 2024-01-28 RX ADMIN — Medication 50 MICROGRAM(S): at 05:00

## 2024-01-28 RX ADMIN — Medication 1 APPLICATION(S): at 21:55

## 2024-01-28 RX ADMIN — INSULIN GLARGINE 12 UNIT(S): 100 INJECTION, SOLUTION SUBCUTANEOUS at 21:53

## 2024-01-28 RX ADMIN — Medication 3 UNIT(S): at 17:24

## 2024-01-28 RX ADMIN — Medication 2: at 21:54

## 2024-01-28 RX ADMIN — POLYETHYLENE GLYCOL 3350 17 GRAM(S): 17 POWDER, FOR SOLUTION ORAL at 17:24

## 2024-01-28 RX ADMIN — MAGNESIUM OXIDE 400 MG ORAL TABLET 400 MILLIGRAM(S): 241.3 TABLET ORAL at 15:40

## 2024-01-28 RX ADMIN — ENOXAPARIN SODIUM 40 MILLIGRAM(S): 100 INJECTION SUBCUTANEOUS at 15:41

## 2024-01-28 RX ADMIN — Medication 1 MILLIGRAM(S): at 12:35

## 2024-01-28 RX ADMIN — INSULIN GLARGINE 6 UNIT(S): 100 INJECTION, SOLUTION SUBCUTANEOUS at 00:22

## 2024-01-28 RX ADMIN — AMLODIPINE BESYLATE 10 MILLIGRAM(S): 2.5 TABLET ORAL at 06:01

## 2024-01-28 RX ADMIN — Medication 3: at 12:36

## 2024-01-28 RX ADMIN — PANTOPRAZOLE SODIUM 40 MILLIGRAM(S): 20 TABLET, DELAYED RELEASE ORAL at 06:01

## 2024-01-28 RX ADMIN — Medication 5 UNIT(S): at 12:36

## 2024-01-28 RX ADMIN — Medication 3: at 09:13

## 2024-01-28 NOTE — PROGRESS NOTE ADULT - SUBJECTIVE AND OBJECTIVE BOX
MARU CANTU , [id number],  [facility] [id number]    Time of encounter : around noon  daughter at bedside  very pleasant , saturating well on room air.   skin lesions are pruritic- daughter mentioned she is on topical steroids ( triamcinolone, vaseline, MSK planning for systemic treatment.  she at two meals at home, now getting three meals- endocrine helping with blood glucose.    T(C): 36.4 (01-28-24 @ 12:48), Max: 36.7 (01-27-24 @ 20:35)  HR: 73 (01-28-24 @ 12:48) (69 - 80)  BP: 139/65 (01-28-24 @ 12:48) (133/60 - 166/73)  RR: 18 (01-28-24 @ 12:48) (16 - 18)  SpO2: 98% (01-28-24 @ 12:48) (95% - 98%)    aluminum hydroxide/magnesium hydroxide/simethicone Suspension 30 milliLiter(s) Oral every 4 hours PRN  amLODIPine   Tablet 10 milliGRAM(s) Oral daily  AQUAPHOR (petrolatum Ointment) 1 Application(s) Topical two times a day  bacitracin   Ointment 1 Application(s) Topical daily  dextrose 5%. 1000 milliLiter(s) IV Continuous <Continuous>  dextrose 5%. 1000 milliLiter(s) IV Continuous <Continuous>  dextrose 50% Injectable 12.5 Gram(s) IV Push once  dextrose 50% Injectable 25 Gram(s) IV Push once  dextrose 50% Injectable 25 Gram(s) IV Push once  dextrose Oral Gel 15 Gram(s) Oral once PRN  enoxaparin Injectable 40 milliGRAM(s) SubCutaneous every 24 hours  folic acid 1 milliGRAM(s) Oral daily  glucagon  Injectable 1 milliGRAM(s) IntraMuscular once  insulin glargine Injectable (LANTUS) 13 Unit(s) SubCutaneous at bedtime  insulin lispro (ADMELOG) corrective regimen sliding scale   SubCutaneous Before meals and at bedtime  insulin lispro Injectable (ADMELOG) 5 Unit(s) SubCutaneous three times a day before meals  levothyroxine 50 MICROGram(s) Oral daily  magnesium oxide 400 milliGRAM(s) Oral once  magnesium sulfate  IVPB 1 Gram(s) IV Intermittent once  melatonin 3 milliGRAM(s) Oral at bedtime PRN  metoprolol succinate ER 50 milliGRAM(s) Oral daily  pantoprazole    Tablet 40 milliGRAM(s) Oral before breakfast  triamcinolone 0.1% Cream 1 Application(s) Topical every 12 hours      Physical Exam :    General exam : pleasant, awake, alert and oriented.  RRR  good air entry bilaterally  no edema noted on bilateral lower ext  skin- plaque lesion in trunk and extremities.  neuro- non focal.     CBC Full  -  ( 28 Jan 2024 05:30 )  WBC Count : 9.24 K/uL  RBC Count : 3.69 M/uL  Hemoglobin : 11.2 g/dL  Hematocrit : 33.3 %  Platelet Count - Automated : 197 K/uL  Mean Cell Volume : 90.2 fl  Mean Cell Hemoglobin : 30.4 pg  Mean Cell Hemoglobin Concentration : 33.6 gm/dL  Auto Neutrophil # : 6.66 K/uL  Auto Lymphocyte # : 1.44 K/uL  Auto Monocyte # : 0.73 K/uL  Auto Eosinophil # : 0.32 K/uL  Auto Basophil # : 0.03 K/uL  Auto Neutrophil % : 72.1 %  Auto Lymphocyte % : 15.6 %  Auto Monocyte % : 7.9 %  Auto Eosinophil % : 3.5 %  Auto Basophil % : 0.3 %        01-28    132<L>  |  97  |  25<H>  ----------------------------<  321<H>  4.3   |  27  |  1.03    Ca    9.8      28 Jan 2024 05:30  Phos  2.3     01-28  Mg     1.7     01-28    TPro  7.1  /  Alb  3.4  /  TBili  0.3  /  DBili  x   /  AST  18  /  ALT  13  /  AlkPhos  113  01-28    Daily     Daily   CAPILLARY BLOOD GLUCOSE      POCT Blood Glucose.: 294 mg/dL (28 Jan 2024 11:50)      Urinalysis with Rflx Culture (collected 26 Jan 2024 10:09)    Culture - Urine (collected 26 Jan 2024 10:09)  Source: Clean Catch None  Final Report (27 Jan 2024 08:37):    Specimen appears CONTAMINATED. Lab suggests repeat clean catch specimen.      Urinalysis Basic - ( 28 Jan 2024 05:30 )    Color: x / Appearance: x / SG: x / pH: x  Gluc: 321 mg/dL / Ketone: x  / Bili: x / Urobili: x   Blood: x / Protein: x / Nitrite: x   Leuk Esterase: x / RBC: x / WBC x   Sq Epi: x / Non Sq Epi: x / Bacteria: x

## 2024-01-28 NOTE — PROGRESS NOTE ADULT - PROBLEM SELECTOR PLAN 4
immunotherapy induced T1D  -A1c 8.2  - endo recs insulin 13U at bedtime, increase lispro to 5 units before each meal given postprandial hyperglycemia  - FS running low 84 o/n, improved to 151, pt given decreased dose of lantus 6 units  - f/u endo recs given hypoglycemia immunotherapy induced T1D  -A1c 8.2  - endo recs insulin lantus 13U at bedtime, increase lispro to 5 units before each meal given postprandial hyperglycemia  - FS running low 84 o/n, improved to 151, pt given decreased dose of lantus 6 units due to hypoglycemia  - f/u endo recs given hypoglycemia

## 2024-01-28 NOTE — PROGRESS NOTE ADULT - PROBLEM SELECTOR PLAN 1
Syncopal episode that lasted a few seconds with no preceding symptoms.  No seizure like activity.  EKG without arrythmia, although has a known LBBB   Patient had similar syncopal episode in May 2023, for which she underwent an extensive cardiac evaluation with her cardiologist at [facility], Dr. Sergei Hardin, including Holter and echo, which were unrevealing. Last echo 3 weeks ago during hospitalization for hyerglycemia in [place] with moderate aortic stenosis, mild MR.    Pt recommends home PT with 1 person assist.    - f/u repeat TTE  - f/u CTH to rule out possible head strike during syncope  - Orthostatics negative   - Monitor on telemetry for arrhythmias   - fall precautions
Syncopal episode that lasted a few seconds with no preceding symptoms.  No seizure like activity.    -EKG reveals NSR with LBBB  -per medicine: pt had extensive workup for syncope in May 2023, Dr. Sergei Hardin at [facility] including Holter/Echo, unrevealing  -last echo 3 weeks ago in [place]- hyperglycemia in [place] with moderate aortic stenosis, mild MR.    -orthostatics negative, CTH 1/27/24: no evidence of acute abnormality/hemorrhage, chronic microvascular ischemic changes, mucosal inflammation in R maxillary sinus and within R ethmoid/frontal sinuses  - f/u repeat TTE and b/l carotid ultrasound  - consider EP consult for loop recorder  - fall precautions.
Syncopal episode that lasted a few seconds with no preceding symptoms.  No seizure like activity.  Negative orthostatics. No hypoglycemia May represent cardiac etiology, though EKG without arrythmia.   Patient had similar syncopal episode in May 2023, for which she underwent an extensive cardiac evaluation with her cardiolgist at [facility], Dr. Sergei Hardin, including Holter and echo, which were unrevealing. Last echo 3 weeks ago during hospitalization for hyerglycemia in [place] with moderate aortic stenosis, mild MR. Cardiology consulted  Pt recommends home PT with 1 person assist.    - f/u cardiology recs  - fall precautions

## 2024-01-28 NOTE — PROGRESS NOTE ADULT - SUBJECTIVE AND OBJECTIVE BOX
Interventional Cardiology PA Adult Progress Note    C.C.: syncope    Subjective Assessment: Pt denies chest pain, shortness of breath, abdominal pain, nausea, vomiting, dizziness, headache.   ROS Negative except as per Subjective and HPI  	  MEDICATIONS:  amLODIPine   Tablet 10 milliGRAM(s) Oral daily  metoprolol succinate ER 50 milliGRAM(s) Oral daily        melatonin 3 milliGRAM(s) Oral at bedtime PRN    aluminum hydroxide/magnesium hydroxide/simethicone Suspension 30 milliLiter(s) Oral every 4 hours PRN  pantoprazole    Tablet 40 milliGRAM(s) Oral before breakfast    dextrose 50% Injectable 12.5 Gram(s) IV Push once  dextrose 50% Injectable 25 Gram(s) IV Push once  dextrose 50% Injectable 25 Gram(s) IV Push once  dextrose Oral Gel 15 Gram(s) Oral once PRN  glucagon  Injectable 1 milliGRAM(s) IntraMuscular once  insulin glargine Injectable (LANTUS) 13 Unit(s) SubCutaneous at bedtime  insulin lispro (ADMELOG) corrective regimen sliding scale   SubCutaneous Before meals and at bedtime  insulin lispro Injectable (ADMELOG) 5 Unit(s) SubCutaneous three times a day before meals  levothyroxine 50 MICROGram(s) Oral daily    dextrose 5%. 1000 milliLiter(s) IV Continuous <Continuous>  dextrose 5%. 1000 milliLiter(s) IV Continuous <Continuous>  enoxaparin Injectable 40 milliGRAM(s) SubCutaneous every 24 hours  folic acid 1 milliGRAM(s) Oral daily  magnesium oxide 400 milliGRAM(s) Oral once  magnesium sulfate  IVPB 1 Gram(s) IV Intermittent once      	    [PHYSICAL EXAM:  TELEMETRY:  T(C): 36.3 (01-28-24 @ 09:03), Max: 36.7 (01-27-24 @ 20:35)  HR: 73 (01-28-24 @ 09:03) (69 - 80)  BP: 166/73 (01-28-24 @ 09:03) (132/68 - 166/73)  RR: 18 (01-28-24 @ 09:03) (16 - 18)  SpO2: 98% (01-28-24 @ 09:03) (95% - 98%)  Wt(kg): --  I&O's Summary    27 Jan 2024 07:01  -  28 Jan 2024 07:00  --------------------------------------------------------  IN: 480 mL / OUT: 850 mL / NET: -370 mL    28 Jan 2024 07:01  -  28 Jan 2024 09:57  --------------------------------------------------------  IN: 140 mL / OUT: 0 mL / NET: 140 mL      Height (cm): 154.9 (01-27 @ 12:40)  Mendez:  Central/PICC/Mid Line:                                         Appearance: Normal	  HEENT:   Normal oral mucosa, PERRL, EOMI	  Neck: Supple  Cardiovascular: Normal S1 S2, +3/6 blowing systolic murmur  Respiratory: Lungs clear to auscultation	  Gastrointestinal:  Soft, Non-tender, + BS	  Skin: pt has crusted, erythematous psoriatic rashes diffusely, worst on B/L lower legs and R hand  Extremities: Normal range of motion, R LE edema >LLE edema  Vascular: warm  Neurologic: Non-focal  Psychiatry: A & O x 3, Mood & affect appropriate      	    ECG:  	  RADIOLOGY:   DIAGNOSTIC TESTING:  [ ] Echocardiogram:  [ ]  Catheterization:  [ ] Stress Test:    [ ] TERRI  OTHER: 	    LABS:	 	  CARDIAC MARKERS:                                  11.2   9.24  )-----------( 197      ( 28 Jan 2024 05:30 )             33.3     01-28    132<L>  |  97  |  25<H>  ----------------------------<  321<H>  4.3   |  27  |  1.03    Ca    9.8      28 Jan 2024 05:30  Phos  2.3     01-28  Mg     1.7     01-28    TPro  7.1  /  Alb  3.4  /  TBili  0.3  /  DBili  x   /  AST  18  /  ALT  13  /  AlkPhos  113  01-28    proBNP:   Lipid Profile:   HgA1c:   TSH:       ASSESSMENT/PLAN: 	        DVT ppx:  Dispo:

## 2024-01-28 NOTE — PROGRESS NOTE ADULT - PROBLEM SELECTOR PLAN 2
Diagnosed in Dec 2022, managed at [place], s/p cholecystectomy, chemo until June 2023, now on immunotherapy Durvalumab which was stopped on Dec 6th 2023 for toxicity (new onset T1DM and plaque psoriasis)    - Obtain collateral from patient's oncologist Dr Roel Crane at [facility].
Diagnosed in Dec 2022, managed at [place], s/p cholecystectomy, chemo until June 2023, now on immunotherapy Durvalumab which was stopped on Dec 6th 2023 for toxicity (new onset T1DM and plaque psoriasis)  - Obtain collateral from patient's oncologist Dr Roel Crane at [facility]
C/w amlodipine 10 mg QD, Toprol 50 mg QD

## 2024-01-28 NOTE — PROGRESS NOTE ADULT - PROBLEM SELECTOR PLAN 7
F: restrict  E: replete  Diet: CC  Code: FULL  DVT: lovenox  Dispo: RMF
Patient has been on intermittent courses of topical corticosteroids, topical calcipotriol but no improvement.    - May follow up with outpatient dermatology
-SBP 120s  -C/w amlodipine 10 mg QD, Toprol 50 mg QD

## 2024-01-28 NOTE — PROGRESS NOTE ADULT - SUBJECTIVE AND OBJECTIVE BOX
SUBJECTIVE / INTERVAL HPI: Patient was seen and examined this morning.     CAPILLARY BLOOD GLUCOSE & INSULIN RECEIVED  227 mg/dL (01-26 @ 22:24) -> 13 units of lantus.    142 mg/dL (01-27 @ 09:18) -> 3 units of lispro.    282 mg/dL (01-27 @ 14:45) -> 3 units of lispro + 3 units of lispro sliding scale.    246 mg/dL (01-27 @ 18:03) -> 5 units of lispro + 2 units of lispro sliding scale.    84 mg/dL (01-27 @ 22:06) -> Ø   100 mg/dL (01-27 @ 23:12) -> Ø   151 mg/dL (01-27 @ 23:55) -> 6 units of lantus.    272 mg/dL (01-28 @ 02:44) -> 4 units of lantus.    278 mg/dL (01-28 @ 06:22) -> 5 units of lispro + 3 units of lispro sliding scale.    257 mg/dL (01-28 @ 08:22) ->     REVIEW OF SYSTEMS  Constitutional:  Negative fever, chills or loss of appetite.  Eyes:  Negative blurry vision or double vision.  Cardiovascular:  Negative for chest pain or palpitations.  Respiratory:  Negative for cough, wheezing, or shortness of breath.    Gastrointestinal:  Negative for nausea, vomiting, diarrhea, constipation, or abdominal pain.  Genitourinary:  Negative frequency, urgency or dysuria.  Neurologic:  No headache, confusion, dizziness, lightheadedness.    PHYSICAL EXAM  Vital Signs Last 24 Hrs  T(C): 36.3 (28 Jan 2024 09:03), Max: 36.7 (27 Jan 2024 20:35)  T(F): 97.3 (28 Jan 2024 09:03), Max: 98.1 (27 Jan 2024 20:35)  HR: 73 (28 Jan 2024 09:03) (69 - 80)  BP: 166/73 (28 Jan 2024 09:03) (132/68 - 166/73)  BP(mean): 98 (28 Jan 2024 06:00) (87 - 100)  RR: 18 (28 Jan 2024 09:03) (16 - 18)  SpO2: 98% (28 Jan 2024 09:03) (95% - 98%)    Parameters below as of 28 Jan 2024 09:03  Patient On (Oxygen Delivery Method): room air    Constitutional: Awake, alert, in no acute distress.   HEENT: Normocephalic, atraumatic, RADHA.  Respiratory: Lungs clear to ausculation bilaterally.   Cardiovascular: regular rhythm, normal S1 and S2, no audible murmurs.   GI: soft, non-tender, non-distended, bowel sounds present.  Extremities: No lower extremity edema.  Psychiatric: AAO x 3. Normal affect/mood.     LABS  CBC - WBC/HGB/HTC/PLT: 9.24/11.2/33.3/197 (01-28-24)  BMP - Na/K/Cl/Bicarb/BUN/Cr/Gluc/AG/eGFR: 132/4.3/97/27/25/1.03/321/8/53 (01-28-24)  Ca - 9.8 (01-28-24)  Phos - 2.3 (01-28-24)  Mg - 1.7 (01-28-24)  LFT - Alb/Tprot/Tbili/Dbili/AlkPhos/ALT/AST: 3.4/--/0.3/--/113/13/18 (01-28-24)  Thyroid Stimulating Hormone, Serum: 8.120 (01-26-24)  Total T4/Free T4: --/1.370 (01-26-24)    MEDICATIONS  MEDICATIONS  (STANDING):  amLODIPine   Tablet 10 milliGRAM(s) Oral daily  dextrose 5%. 1000 milliLiter(s) (100 mL/Hr) IV Continuous <Continuous>  dextrose 5%. 1000 milliLiter(s) (50 mL/Hr) IV Continuous <Continuous>  dextrose 50% Injectable 12.5 Gram(s) IV Push once  dextrose 50% Injectable 25 Gram(s) IV Push once  dextrose 50% Injectable 25 Gram(s) IV Push once  enoxaparin Injectable 40 milliGRAM(s) SubCutaneous every 24 hours  folic acid 1 milliGRAM(s) Oral daily  glucagon  Injectable 1 milliGRAM(s) IntraMuscular once  insulin glargine Injectable (LANTUS) 13 Unit(s) SubCutaneous at bedtime  insulin lispro (ADMELOG) corrective regimen sliding scale   SubCutaneous Before meals and at bedtime  insulin lispro Injectable (ADMELOG) 5 Unit(s) SubCutaneous three times a day before meals  levothyroxine 50 MICROGram(s) Oral daily  magnesium oxide 400 milliGRAM(s) Oral once  magnesium sulfate  IVPB 1 Gram(s) IV Intermittent once  metoprolol succinate ER 50 milliGRAM(s) Oral daily  pantoprazole    Tablet 40 milliGRAM(s) Oral before breakfast    MEDICATIONS  (PRN):  aluminum hydroxide/magnesium hydroxide/simethicone Suspension 30 milliLiter(s) Oral every 4 hours PRN Dyspepsia  dextrose Oral Gel 15 Gram(s) Oral once PRN Blood Glucose LESS THAN 70 milliGRAM(s)/deciliter  melatonin 3 milliGRAM(s) Oral at bedtime PRN Insomnia    ASSESSMENT / RECOMMENDATIONS    A1C: 8.2 %  BUN: 25  Creatinine: 1.03  GFR: 53  Weight: 53.1  BMI: 22.1  EF:     # Type 2 diabetes mellitus with hyperglycemia  - Please continue lantus *** units at bedtime.   - Continue lispro *** units before each meal.  - Continue lispro moderate / low dose sliding scale before meals and at bedtime.  - Patient's fingerstick glucose goal is 100-180 mg/dL.    - Discharge recommendations to be discussed.   - Patient can follow up at discharge with [facility] by calling [phone] to make an appointment.      Case discussed with Dr. Bae. Primary team updated.       Dino Schmidt    Endocrinology Fellow    Service Pager: [phone]    SUBJECTIVE / INTERVAL HPI: The patient was seen and examined this morning. Her C-peptide resulted as <0.1 ng/dL with a blood glucose of 189 mg/dL (01-27 @ 5:30). Her blood glucose dropped last night after dinner. She reports eating almost no carbs for dinner as she felt full from her other meals earlier in the day (at home, she typically eats twice daily only). For dinner, she had chicken with an orange (she didn't eat the orzo). Her glucose dropped from 246 mg/dL to 84 mg/dL after dinner.     Yesterday, the patient's daughter placed another Freestyle Miguel 3 sensor on her mother and noticed the drop in her glucose levels while at home. She immediately contacted the nurse to inform her about the situation and decided to come to the hospital to check on her. Upon arrival, the patient had already consumed two orange juices. The primary team was concerned about hypoglycemia, so her dose of lantus was decreased from 13 to 10 units. The patient's glucose levels were high in the morning, measuring 278 mg/dL before breakfast. She later had her usual meal of eggs, toast, yogurt, and a fruit cup.     We explained the importance of maintaining a consistent carbohydrate diet to balance the insulin doses and prevent future episodes of hypoglycemia. Furthermore, since she consistently eats less carbs for dinner and appears more sensitive to insulin later in the day, she might benefit from less insulin before dinner. Additionally, even though her glucose remained elevated with 10 units of lantus last night, her glucose was noted to decrease the night prior after receiving 13 units (from 227 mg/dL to 142 mg/dL0. Therefore, she might also benefit from decreasing the dose of long-acting insulin slightly. The patient is pending echocardiogram for cardiac work-up given episode of syncope.     CAPILLARY BLOOD GLUCOSE & INSULIN RECEIVED  227 mg/dL (01-26 @ 22:24) -> 13 units of lantus.    142 mg/dL (01-27 @ 09:18) -> 3 units of lispro.    282 mg/dL (01-27 @ 14:45) -> 3 units of lispro + 3 units of lispro sliding scale.    246 mg/dL (01-27 @ 18:03) -> 5 units of lispro + 2 units of lispro sliding scale.    84 mg/dL (01-27 @ 22:06) -> Ø   100 mg/dL (01-27 @ 23:12) -> Ø   151 mg/dL (01-27 @ 23:55) -> 6 units of lantus.    272 mg/dL (01-28 @ 02:44) -> 4 units of lantus.    257 mg/dL (01-28 @ 08:22) -> 5 units of lispro + 3 units of lispro sliding scale.    294 mg/dL (01-28 @ 11:50) -> 5 units of lispro + 3 units of lispro sliding scale.      REVIEW OF SYSTEMS  Constitutional:  Negative fever, chills or loss of appetite.  Cardiovascular:  Negative for chest pain or palpitations.  Respiratory:  Negative for cough, wheezing, or shortness of breath.    Gastrointestinal:  Negative for nausea, vomiting, diarrhea, constipation, or abdominal pain.  Neurologic:  No headache, confusion, dizziness, lightheadedness.    PHYSICAL EXAM  Vital Signs Last 24 Hrs  T(C): 36.3 (28 Jan 2024 09:03), Max: 36.7 (27 Jan 2024 20:35)  T(F): 97.3 (28 Jan 2024 09:03), Max: 98.1 (27 Jan 2024 20:35)  HR: 73 (28 Jan 2024 09:03) (69 - 80)  BP: 166/73 (28 Jan 2024 09:03) (132/68 - 166/73)  BP(mean): 98 (28 Jan 2024 06:00) (87 - 100)  RR: 18 (28 Jan 2024 09:03) (16 - 18)  SpO2: 98% (28 Jan 2024 09:03) (95% - 98%)    Parameters below as of 28 Jan 2024 09:03  Patient On (Oxygen Delivery Method): room air    Constitutional: Awake, alert, elderly female, in no acute distress.   HEENT: Normocephalic, atraumatic, RADHA.  Respiratory: Lungs clear to ausculation bilaterally.   Cardiovascular: regular rhythm, normal S1 and S2, no audible murmurs.   GI: soft, non-tender, non-distended, bowel sounds present.  Extremities: No lower extremity edema.  Psychiatric: AAO x 3.     LABS  CBC - WBC/HGB/HTC/PLT: 9.24/11.2/33.3/197 (01-28-24)  BMP - Na/K/Cl/Bicarb/BUN/Cr/Gluc/AG/eGFR: 132/4.3/97/27/25/1.03/321/8/53 (01-28-24)  Ca - 9.8 (01-28-24)  Phos - 2.3 (01-28-24)  Mg - 1.7 (01-28-24)  LFT - Alb/Tprot/Tbili/Dbili/AlkPhos/ALT/AST: 3.4/--/0.3/--/113/13/18 (01-28-24)  Thyroid Stimulating Hormone, Serum: 8.120 (01-26-24)  Total T4/Free T4: --/1.370 (01-26-24)    MEDICATIONS  MEDICATIONS  (STANDING):  amLODIPine   Tablet 10 milliGRAM(s) Oral daily  dextrose 5%. 1000 milliLiter(s) (100 mL/Hr) IV Continuous <Continuous>  dextrose 5%. 1000 milliLiter(s) (50 mL/Hr) IV Continuous <Continuous>  dextrose 50% Injectable 12.5 Gram(s) IV Push once  dextrose 50% Injectable 25 Gram(s) IV Push once  dextrose 50% Injectable 25 Gram(s) IV Push once  enoxaparin Injectable 40 milliGRAM(s) SubCutaneous every 24 hours  folic acid 1 milliGRAM(s) Oral daily  glucagon  Injectable 1 milliGRAM(s) IntraMuscular once  insulin glargine Injectable (LANTUS) 13 Unit(s) SubCutaneous at bedtime  insulin lispro (ADMELOG) corrective regimen sliding scale   SubCutaneous Before meals and at bedtime  insulin lispro Injectable (ADMELOG) 5 Unit(s) SubCutaneous three times a day before meals  levothyroxine 50 MICROGram(s) Oral daily  magnesium oxide 400 milliGRAM(s) Oral once  magnesium sulfate  IVPB 1 Gram(s) IV Intermittent once  metoprolol succinate ER 50 milliGRAM(s) Oral daily  pantoprazole    Tablet 40 milliGRAM(s) Oral before breakfast    MEDICATIONS  (PRN):  aluminum hydroxide/magnesium hydroxide/simethicone Suspension 30 milliLiter(s) Oral every 4 hours PRN Dyspepsia  dextrose Oral Gel 15 Gram(s) Oral once PRN Blood Glucose LESS THAN 70 milliGRAM(s)/deciliter  melatonin 3 milliGRAM(s) Oral at bedtime PRN Insomnia    ASSESSMENT / RECOMMENDATIONS  Ms. Kramer is a 88-year-old woman with recently diagnosed type 1 diabetes mellitus, hypothyroidism and gallbladder cancer (s/p chemo and Durvalumab) and psoriasis who presented to [facility] emergency department (01/26/24) with syncopal episode. Endocrinology was consulted for recommendations regarding management of diabetes and hypothyroidism, also given concern for adrenal insufficiency.    A1C: 8.2 %  BUN: 25  Creatinine: 1.03  GFR: 53  Weight: 53.1  BMI: 22.1    # Type 1 diabetes mellitus with hyperglycemia  - Please DECREASE lantus 12 units at bedtime.   - Continue with lispro 5 units before breakfast and lunch. DECREASE lispro to 3 units before dinner.   - Encouraged patient to keep some form of carb with every meal.  - Continue lispro low dose sliding scale before meals and at bedtime.  - Patient's fingerstick glucose goal is 100-180 mg/dL.    - Discharge recommendations to be discussed.   - Patient can follow up at discharge with [facility] by calling [phone] to make an appointment.      # Hypothyroidism  - Likely from immunotherapy  - recently increased to levothyroxine 50 mcg  - TSH 8.120, Free T4 1.370  - Continue with levothyroxine 50 mcg daily in the morning, to be given with only with water, 1 hour before breakfast, on an empty stomach.    # Concern for adrenal insufficiency  - Cortisol levels been repeatedly >10, recent one here at 16.320  - She has been normotensive.  - No convincing evidence of adrenal insufficiency  - Keep off steroids  - Would look for alternate causes of syncopal episode    Case discussed with Dr. Bae. Primary team updated.       Dino Schmidt    Endocrinology Fellow    Service Pager: [phone]

## 2024-01-28 NOTE — PROGRESS NOTE ADULT - ATTENDING COMMENTS
88yoF h/o insulin deficient DM likely due to being on Durvalumab (h/o gallbladder CA), hypothyroidism, admitted 01/26 for syncope.    #Type 1 diabetes mellitus: FSG dropped after dinner last night - she did not eat all the orzo that was on the tray. Received 10u lantus in total last evening and morning FSG was 278. She appears to eat the fewest carbs for dinner.   -Agree with decreasing lantus to 12 units at bedtime and giving Lispro 5u with breakfast and lunch, 3u with dinner.      #Hypothyroidism: Agree with continuing levothyroxine 50 mcg    Maurilio Bae MD  Attending Endocrinologist.

## 2024-01-28 NOTE — PROGRESS NOTE ADULT - PROBLEM SELECTOR PLAN 8
F: restrict  E: replete  Diet: CC  Code: FULL  DVT: lovenox  Dispo:
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

## 2024-01-28 NOTE — PROGRESS NOTE ADULT - PROBLEM SELECTOR PLAN 3
Diagnosed in Dec 2022, managed at [place], s/p cholecystectomy, chemo until June 2023, now on immunotherapy Durvalumab which was stopped on Dec 6th 2023 for toxicity (new onset T1DM and plaque psoriasis)    - Obtain collateral from patient's oncologist Dr Roel Crane at [facility].
Hyponatremic on arrival. Euvolemic on exam. TSH wnl. unlikely adrenal insufficiency per endo.  Based on urine Na>20 and Urine Osm>100. 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  Now resolved with fluid restiction    - ctm
-R>L lower extremity swelling  -B/L LLE US 1/27/24: No evidence of DVT in either lower extremity

## 2024-01-28 NOTE — PROGRESS NOTE ADULT - PROBLEM SELECTOR PLAN 5
Immunotherapy induced T1DM  Home regimen: Insulin novolog 3-4U tid and insulin basaglar 13U qhs.   Per daughter, patient with more frequent episodes of hypoglycemia. This may be in the setting of adrenal insufficiency. A1C 8.4    -C/w Lispro 5U TID and lantus 13U with JESSICA but will adjust based on endocrine recs
- Continue home synthroid 50 mcg qd
TSH 8.120, Free T4 1.370  - Continue with levothyroxine 50 mcg daily in the morning

## 2024-01-28 NOTE — PROGRESS NOTE ADULT - PROBLEM SELECTOR PLAN 6
Patient has been on intermittent courses of topical corticosteroids, topical calcipotriol but no improvement.    - Follow up dermatology recs
- Continue home synthroid 50 mcg qd
Patient has been on intermittent courses of topical corticosteroids, topical calcipotriol but no improvement.  - follow up with outpatient dermatology

## 2024-01-29 VITALS
HEART RATE: 73 BPM | DIASTOLIC BLOOD PRESSURE: 65 MMHG | RESPIRATION RATE: 19 BRPM | SYSTOLIC BLOOD PRESSURE: 140 MMHG | TEMPERATURE: 98 F

## 2024-01-29 LAB
ANION GAP SERPL CALC-SCNC: 9 MMOL/L — SIGNIFICANT CHANGE UP (ref 5–17)
BUN SERPL-MCNC: 21 MG/DL — SIGNIFICANT CHANGE UP (ref 7–23)
CALCIUM SERPL-MCNC: 9.6 MG/DL — SIGNIFICANT CHANGE UP (ref 8.4–10.5)
CHLORIDE SERPL-SCNC: 98 MMOL/L — SIGNIFICANT CHANGE UP (ref 96–108)
CO2 SERPL-SCNC: 26 MMOL/L — SIGNIFICANT CHANGE UP (ref 22–31)
CREAT SERPL-MCNC: 0.96 MG/DL — SIGNIFICANT CHANGE UP (ref 0.5–1.3)
EGFR: 58 ML/MIN/1.73M2 — LOW
GLUCOSE BLDC GLUCOMTR-MCNC: 168 MG/DL — HIGH (ref 70–99)
GLUCOSE BLDC GLUCOMTR-MCNC: 172 MG/DL — HIGH (ref 70–99)
GLUCOSE SERPL-MCNC: 157 MG/DL — HIGH (ref 70–99)
HCT VFR BLD CALC: 32.9 % — LOW (ref 34.5–45)
HGB BLD-MCNC: 11.1 G/DL — LOW (ref 11.5–15.5)
MAGNESIUM SERPL-MCNC: 2 MG/DL — SIGNIFICANT CHANGE UP (ref 1.6–2.6)
MCHC RBC-ENTMCNC: 30.7 PG — SIGNIFICANT CHANGE UP (ref 27–34)
MCHC RBC-ENTMCNC: 33.7 GM/DL — SIGNIFICANT CHANGE UP (ref 32–36)
MCV RBC AUTO: 90.9 FL — SIGNIFICANT CHANGE UP (ref 80–100)
NRBC # BLD: 0 /100 WBCS — SIGNIFICANT CHANGE UP (ref 0–0)
PLATELET # BLD AUTO: 191 K/UL — SIGNIFICANT CHANGE UP (ref 150–400)
POTASSIUM SERPL-MCNC: 4.4 MMOL/L — SIGNIFICANT CHANGE UP (ref 3.5–5.3)
POTASSIUM SERPL-SCNC: 4.4 MMOL/L — SIGNIFICANT CHANGE UP (ref 3.5–5.3)
RBC # BLD: 3.62 M/UL — LOW (ref 3.8–5.2)
RBC # FLD: 13.6 % — SIGNIFICANT CHANGE UP (ref 10.3–14.5)
SODIUM SERPL-SCNC: 133 MMOL/L — LOW (ref 135–145)
WBC # BLD: 8.06 K/UL — SIGNIFICANT CHANGE UP (ref 3.8–10.5)
WBC # FLD AUTO: 8.06 K/UL — SIGNIFICANT CHANGE UP (ref 3.8–10.5)

## 2024-01-29 PROCEDURE — 84132 ASSAY OF SERUM POTASSIUM: CPT

## 2024-01-29 PROCEDURE — 84484 ASSAY OF TROPONIN QUANT: CPT

## 2024-01-29 PROCEDURE — 87086 URINE CULTURE/COLONY COUNT: CPT

## 2024-01-29 PROCEDURE — 84681 ASSAY OF C-PEPTIDE: CPT

## 2024-01-29 PROCEDURE — 80053 COMPREHEN METABOLIC PANEL: CPT

## 2024-01-29 PROCEDURE — 83935 ASSAY OF URINE OSMOLALITY: CPT

## 2024-01-29 PROCEDURE — 93010 ELECTROCARDIOGRAM REPORT: CPT

## 2024-01-29 PROCEDURE — 93005 ELECTROCARDIOGRAM TRACING: CPT

## 2024-01-29 PROCEDURE — 93970 EXTREMITY STUDY: CPT

## 2024-01-29 PROCEDURE — 83036 HEMOGLOBIN GLYCOSYLATED A1C: CPT

## 2024-01-29 PROCEDURE — C8929: CPT

## 2024-01-29 PROCEDURE — 70450 CT HEAD/BRAIN W/O DYE: CPT

## 2024-01-29 PROCEDURE — 83735 ASSAY OF MAGNESIUM: CPT

## 2024-01-29 PROCEDURE — 82533 TOTAL CORTISOL: CPT

## 2024-01-29 PROCEDURE — 82024 ASSAY OF ACTH: CPT

## 2024-01-29 PROCEDURE — 84439 ASSAY OF FREE THYROXINE: CPT

## 2024-01-29 PROCEDURE — 86376 MICROSOMAL ANTIBODY EACH: CPT

## 2024-01-29 PROCEDURE — 80048 BASIC METABOLIC PNL TOTAL CA: CPT

## 2024-01-29 PROCEDURE — 85025 COMPLETE CBC W/AUTO DIFF WBC: CPT

## 2024-01-29 PROCEDURE — 97530 THERAPEUTIC ACTIVITIES: CPT

## 2024-01-29 PROCEDURE — 86800 THYROGLOBULIN ANTIBODY: CPT

## 2024-01-29 PROCEDURE — 36415 COLL VENOUS BLD VENIPUNCTURE: CPT

## 2024-01-29 PROCEDURE — 85027 COMPLETE CBC AUTOMATED: CPT

## 2024-01-29 PROCEDURE — 97161 PT EVAL LOW COMPLEX 20 MIN: CPT

## 2024-01-29 PROCEDURE — 82962 GLUCOSE BLOOD TEST: CPT

## 2024-01-29 PROCEDURE — 84300 ASSAY OF URINE SODIUM: CPT

## 2024-01-29 PROCEDURE — 84100 ASSAY OF PHOSPHORUS: CPT

## 2024-01-29 PROCEDURE — 99291 CRITICAL CARE FIRST HOUR: CPT

## 2024-01-29 PROCEDURE — 99232 SBSQ HOSP IP/OBS MODERATE 35: CPT

## 2024-01-29 PROCEDURE — 84443 ASSAY THYROID STIM HORMONE: CPT

## 2024-01-29 PROCEDURE — 81001 URINALYSIS AUTO W/SCOPE: CPT

## 2024-01-29 PROCEDURE — 93306 TTE W/DOPPLER COMPLETE: CPT | Mod: 26

## 2024-01-29 PROCEDURE — 97116 GAIT TRAINING THERAPY: CPT

## 2024-01-29 PROCEDURE — 82570 ASSAY OF URINE CREATININE: CPT

## 2024-01-29 PROCEDURE — 84432 ASSAY OF THYROGLOBULIN: CPT

## 2024-01-29 RX ORDER — INSULIN LISPRO 100/ML
5 VIAL (ML) SUBCUTANEOUS
Qty: 0 | Refills: 0 | DISCHARGE
Start: 2024-01-29

## 2024-01-29 RX ORDER — LEVOTHYROXINE SODIUM 125 MCG
1 TABLET ORAL
Qty: 30 | Refills: 0
Start: 2024-01-29 | End: 2024-02-27

## 2024-01-29 RX ORDER — INSULIN GLARGINE 100 [IU]/ML
12 INJECTION, SOLUTION SUBCUTANEOUS
Qty: 0 | Refills: 0 | DISCHARGE
Start: 2024-01-29

## 2024-01-29 RX ORDER — AMLODIPINE BESYLATE 2.5 MG/1
1 TABLET ORAL
Qty: 30 | Refills: 0
Start: 2024-01-29 | End: 2024-02-27

## 2024-01-29 RX ORDER — INSULIN LISPRO 100/ML
3 VIAL (ML) SUBCUTANEOUS
Qty: 0 | Refills: 0 | DISCHARGE
Start: 2024-01-29

## 2024-01-29 RX ORDER — METOPROLOL TARTRATE 50 MG
1 TABLET ORAL
Qty: 30 | Refills: 0
Start: 2024-01-29 | End: 2024-02-27

## 2024-01-29 RX ADMIN — Medication 1: at 08:29

## 2024-01-29 RX ADMIN — Medication 1 APPLICATION(S): at 11:51

## 2024-01-29 RX ADMIN — Medication 1 APPLICATION(S): at 08:06

## 2024-01-29 RX ADMIN — POLYETHYLENE GLYCOL 3350 17 GRAM(S): 17 POWDER, FOR SOLUTION ORAL at 11:51

## 2024-01-29 RX ADMIN — Medication 1 MILLIGRAM(S): at 11:51

## 2024-01-29 RX ADMIN — AMLODIPINE BESYLATE 10 MILLIGRAM(S): 2.5 TABLET ORAL at 06:43

## 2024-01-29 RX ADMIN — Medication 50 MICROGRAM(S): at 05:35

## 2024-01-29 RX ADMIN — Medication 50 MILLIGRAM(S): at 06:43

## 2024-01-29 RX ADMIN — Medication 5 UNIT(S): at 08:30

## 2024-01-29 RX ADMIN — Medication 5 UNIT(S): at 12:39

## 2024-01-29 RX ADMIN — Medication 1: at 12:38

## 2024-01-29 RX ADMIN — PANTOPRAZOLE SODIUM 40 MILLIGRAM(S): 20 TABLET, DELAYED RELEASE ORAL at 06:43

## 2024-01-29 NOTE — DISCHARGE NOTE PROVIDER - CARE PROVIDER_API CALL
YAHAIRA MAE VRachel  [address] [address]  Phone: [phone]  Fax: ([phone]  Follow Up Time: 1 week    Roel Crane  [address] [place] [address]  Phone: [phone]  Fax: (   )    -  Follow Up Time: 1 week

## 2024-01-29 NOTE — PROGRESS NOTE ADULT - ASSESSMENT
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, managed at [place], s/p chemo until June 2023, now on immunotherapy Durvalumab which was stopped on Dec 6th 2023)   immunotherapy induced type 1 diabetes, hypothyroidism on supplement recently increased to 50 mcg per day, and plaque psoriasis using topical steroids with plan for systemic therapy,  presented after experiencing a syncopal episode-initially admitted to medicine, evaluated by endocrine, unlikely hypoglycemic event, not adrenal insufficiency, LE duplex sono negative, CTH negative, transfer to cardiac tele for rhythm monitoring and further work up for possible cardiac cause for Syncope.    - care by cardiology appreciated,   continue with tele - TTE, carotid sonogram.    -Type 1 DM -HbA1c 8.2, c peptide -low-  immunotherapy induced,   endocrine evaluation appreciated, lantus 13 units daily and premeal insulin   appreciate rec     - Hypothyroidism on synthroid -recently increased to 50 mcg per day to continue for now , appreciated endocrine rec.    - plague psoriasis -family reported using topical steroid, pending systemic treatment from [place]  is pruritic -currently not getting any topical  dw cardiology PA - to restart triamcinolone 0.1 % topical BID, aquaphor bid for hydration ( for symptom treatment.    -  Hyponatremia to 126 on admission, in setting of Hyperglycemia ( in setting of Hypothyroidism )- serum osmolality not checked , urine osmolality is low 270 ( excreting urine )  1/27- corrected sodium is 136 ( measured 132 with glucose 321 ),- agree with Endo -is not SIADH, would not require fluid restriction.   fu BMP   -CT finding of "Mucosal inflammation in the partially visualized right maxillary sinus and within the right ethmoid and frontal sinuses" clinically asymptomatic, no nasal congestion and no pain  if symptom develop would give Augmentin/ NS nasal spray.     # to give bacitracin topical to where IV infiltrated,( right wrist area)  will monitor, no sign of phlebitis     Physical therapy evaluation, oob to chair as tolerated.    dvt prphylasix.    care by cardiology appreciated. med consult will follow, dw daughter, RN, primary team. 
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, managed at [facility], s/p chemo until June 2023, now on immunotherapy Durvalumab which was stopped on Dec 6th 2023)   immunotherapy induced type 1 diabetes, hypothyroidism on supplement recently increased to 50 mcg per day, and plaque psoriasis using topical steroids with plan for systemic therapy, known LBBB ( at least since May 2023 ekg)   presented after experiencing a syncopal episode-initially admitted to medicine, evaluated by endocrine, unlikely hypoglycemic event, not adrenal insufficiency, LE duplex sono negative, CTH negative, transfer to cardiac tele for rhythm monitoring and further work up for possible cardiac cause for Syncope.    #Syncope   - work up by cardiology, she has extensive work up in May,2023 with [facility] cardiologist, to follow up with [facility]     #Type 1 DM   HbA1c 8.2, c peptide low, likely immunotherapy induced  - endocrine evaluation appreciated, to continue with basal bolus approach, lantus 12 units with premeal insulin as per endocrine rec.     #Hypothyroidism  - on synthroid, recently increased to 50 mcg per day to continue for now   - appreciated endocrine rec.    #Plague psoriasis -family reported using topical steroid, pending systemic treatment from [facility]  - using hydrocortisone 2.5 % for face, for rest of body- triamcinolone 0.1 % topical BID, aquaphor bid for hydration ( for symptom treatment.)     #Hyponatremia   126 on admission, in setting of Hyperglycemia & Hypothyroidism. serum osmolality not checked, urine osmolality is low 270 ( excreting urine ), not SIADH. Corrected Na trend 126 >132 > 134  - Encourage PO intake  
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 Course c/b hyponatremia and uncontrolled type 1 DM to which endocrine is following. Admitted to cardiac telemetry for further workup
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 Course c/b hyponatremia and uncontrolled type 1 DM to which endocrine is following. Admitted to cardiac telemetry for further workup of syncope.

## 2024-01-29 NOTE — DISCHARGE NOTE PROVIDER - HOSPITAL COURSE
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 Pt reported feeling fatigued this morning while making breakfast. She remembered being in a standing position, then feeling lightheaded and losing consciousness. Patient's daughter found her on the ground in a sitting position. Patient denies any CP, palpitations or seizure like symptoms. She had 2 previous falls in the past year.     Patient presented to the ED afebrile, normal HR, hypertensive to 150s with negative orthostatics, satting 95-96% on RA. CBC unremarkable. BMP with hyponatremia 126 (corrected 132) and hypochloremia 92. AM cortisol normal at 1 pm. TSH 8.1 with normal free thyroxine. FSG in 230-280s. EKG NSR with LBBB unchanged compared to previous. Endocrinology consulted.     Pt was admitted to medicine for further monitoring and workup of syncope. Pt was found to have hyponatremia on arrival, Hyponatremic on arrival. Euvolemic on exam. TSH wnl. unlikely adrenal insufficiency per endo.  Based on urine Na>20 and Urine Osm>100. 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 Pt reported feeling fatigued this morning while making breakfast. She remembered being in a standing position, then feeling lightheaded and losing consciousness. Patient's daughter found her on the ground in a sitting position. Patient denies any CP, palpitations or seizure like symptoms. She had 2 previous falls in the past year.     Patient presented to the ED afebrile, normal HR, hypertensive to 150s with negative orthostatics, satting 95-96% on RA. CBC unremarkable. BMP with hyponatremia 126 (corrected 132) and hypochloremia 92. AM cortisol normal at 1 pm. TSH 8.1 with normal free thyroxine. FSG in 230-280s. EKG NSR with LBBB unchanged compared to previous. Endocrinology consulted.     Pt was admitted to medicine for further monitoring and workup of syncope. Pt was found to have hyponatremia on arrival, unlikely adrenal insufficiency per endo. Based on urine Na >20 and urine osm>100. 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 Hyponatremia resolved with fluid restriction.    Endocrine was consulted for immunotherapy induced type 1 diabetes, and recommended lispro 5 units before breakfast and lunch and decreasing lispro to 3 units with dinner. in setting of one episode of hypoglycemia overnight. Pt will continue lantus 12 units at bedtime with fingerstick glucose goal of 100-180mg/dl. Pt has hypothyroidism, likely in setting of immunotherapy, TSH 8.120, Free T4 1.370. Pt will continue levothyroxine 50mg daily.     You were found to have R lower extremity swelling > L leg. B/L LLE US 1/27/24 no evidence of DVT in either lower extremity.     Pt was also found to have plaque psoriasis. Pt was started on triamcinolone cream 1% twice daily. Pt can continue this at home and will follow up with outpatient dermatologist.    On day of discharge, TTE 1/29/24 reveals normal LV and RV systolic function, EF=55-60%, normal atria, mild AS, no other valvular disease. Pt seen and examined at bedside this AM without any complaints or events overnight, VSS, labs and telemetry reviewed and pt stable for discharge as discussed with Dr. Lozano. Pt has received appropriate discharge instructions, including medication regimen, and follow up with Dr. Hardin in 1 week for loop recorder placement. Pt will also need 1 week endocrinologist follow up.    Pt was provided with physical therapy evaluation on this admission and was recommended for home PT. Prescription given on day of discharge.     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 Pt reported feeling fatigued while making breakfast. She remembered being in a standing position, then feeling lightheaded and losing consciousness. Patient's daughter found her on the ground in a sitting position. Patient denies any CP, palpitations or seizure like symptoms. She had 2 previous falls in the past year.     Patient presented to the ED afebrile, normal HR, hypertensive to 150s with negative orthostatics, satting 95-96% on RA. CBC unremarkable. BMP with hyponatremia 126 (corrected 132) and hypochloremia 92. AM cortisol normal at 1 pm. TSH 8.1 with normal free thyroxine. FSG in 230-280s. EKG NSR with LBBB unchanged compared to previous. Endocrinology consulted.     Pt was admitted to medicine for further monitoring and workup of syncope. Pt was found to have hyponatremia on arrival, unlikely adrenal insufficiency per endo. Based on urine Na >20 and urine osm>100. 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 Hyponatremia resolved with fluid restriction.    Endocrine was consulted for immunotherapy induced type 1 diabetes, and recommended lispro 5 units before breakfast and lunch and decreasing lispro to 3 units with dinner. in setting of one episode of hypoglycemia overnight. Pt will continue lantus 12 units at bedtime with fingerstick glucose goal of 100-180mg/dl. Pt has hypothyroidism, likely in setting of immunotherapy, TSH 8.120, Free T4 1.370. Pt will continue levothyroxine 50mg daily.     You were found to have R lower extremity swelling > L leg. B/L LLE US 1/27/24 no evidence of DVT in either lower extremity.     Pt was also found to have plaque psoriasis. Pt was started on triamcinolone cream 1% twice daily. Pt can continue this at home and will follow up with outpatient dermatologist.    On day of discharge, TTE 1/29/24 reveals normal LV and RV systolic function, EF=55-60%, normal atria, mild AS, no other valvular disease. Pt seen and examined at bedside this AM without any complaints or events overnight, VSS, labs and telemetry reviewed and pt stable for discharge as discussed with Dr. Lozano. Pt has received appropriate discharge instructions, including medication regimen, and follow up with Dr. Hardin in 1 week for loop recorder placement. Pt will also need 1 week endocrinologist follow up.    Pt was provided with physical therapy evaluation on this admission and was recommended for home PT. Prescription given on day of discharge.     Discharge medications: amlodipine 10mg daily, Toprol XL 50mg daily, lantus 12 units at bedtime, lispro 5 units with breakfast and lunch, lispro 3 units with dinner, levothyroxine 50mcg daily

## 2024-01-29 NOTE — DISCHARGE NOTE NURSING/CASE MANAGEMENT/SOCIAL WORK - NSDCFUADDAPPT_GEN_ALL_CORE_FT
Patient can follow up at discharge with [facility] by calling [phone] to make an appointment.

## 2024-01-29 NOTE — DISCHARGE NOTE PROVIDER - NSDCCPCAREPLAN_GEN_ALL_CORE_FT
PRINCIPAL DISCHARGE DIAGNOSIS  Diagnosis: Syncope  Assessment and Plan of Treatment: You presented with syncope, fainting episode. Your heart rhythm was monitored on this admission and there were no acute findings. An echocardiogram of your heart was done that was normal and showed mild aortic stenosis and normal ejection fraction, which means the pumping function of your heart is normal. Please follow up with Dr. Hardin at [facility] this week to get a loop recorder placed to monitor your heart rhythm.  We also provided you with a prescription for home physical therapy to improve your strength.      SECONDARY DISCHARGE DIAGNOSES  Diagnosis: History of type 1 diabetes mellitus  Assessment and Plan of Treatment: Your blood sugar was very labile on this admission. Your A1c is 8.2, and ideally we like this number below 6.5. Endocrinology recommend that you start lantus 12 units at bedtime and insulin lispro 5 units with breakfast and lunch and lispro 3 units with dinner.   Maintain a low carbohydrate, low sugar diet, exercise, monitor your fingerstick blood sugars regarly and follow up with your Endocrinologist/Primary Care Doctor.    Diagnosis: Swelling of right lower extremity  Assessment and Plan of Treatment: You had swelling of your R lower leg. Duplex ultrasound of your legs revealed no evidence of deep vein thrombosis in either leg.    Diagnosis: Gallbladder cancer  Assessment and Plan of Treatment: Please follow up with your oncologist Dr. Crane at [facility] for adequate follow up.    Diagnosis: Plaque psoriasis  Assessment and Plan of Treatment: PLease continue steroid cream for your psoriasis. Please follow up with your outpatient dermatologist for better management of this condition.    Diagnosis: HTN (hypertension)  Assessment and Plan of Treatment: Please continue Toprol XL 50mg daily and amlodipine 10mg daily to control your blood pressure.    Diagnosis: Hypothyroidism  Assessment and Plan of Treatment: Please continue with levothyroxine 50mcg daily for your hypothyroidism.     PRINCIPAL DISCHARGE DIAGNOSIS  Diagnosis: Syncope  Assessment and Plan of Treatment: You presented with syncope and fainting episode. Your heart rhythm was monitored on this admission and there were no acute findings. An echocardiogram of your heart was done that was normal and showed mild aortic stenosis and normal ejection fraction, which means the pumping function of your heart is normal. Please follow up with Dr. Hardin at [facility] this week to get a loop recorder placed to monitor your heart rhythm.  We also provided you with a prescription for home physical therapy to improve your strength.      SECONDARY DISCHARGE DIAGNOSES  Diagnosis: History of type 1 diabetes mellitus  Assessment and Plan of Treatment: Your blood sugar was very labile on this admission. Your A1c is 8.2, and ideally we like this number below 6.5. Endocrinology recommend that you start lantus 12 units at bedtime and insulin lispro 5 units with breakfast and lunch and lispro 3 units with dinner.   Maintain a low carbohydrate, low sugar diet, exercise, monitor your fingerstick blood sugars regarly and follow up with your Endocrinologist/Primary Care Doctor.    Diagnosis: Swelling of right lower extremity  Assessment and Plan of Treatment: You had swelling of your R lower leg. Duplex ultrasound of your legs revealed no evidence of deep vein thrombosis in either leg.    Diagnosis: Gallbladder cancer  Assessment and Plan of Treatment: Please follow up with your oncologist Dr. Crane at [facility] for adequate follow up.    Diagnosis: Plaque psoriasis  Assessment and Plan of Treatment: PLease continue steroid cream for your psoriasis. Please follow up with your outpatient dermatologist for better management of this condition.    Diagnosis: HTN (hypertension)  Assessment and Plan of Treatment: Please continue Toprol XL 50mg daily and amlodipine 10mg daily to control your blood pressure.    Diagnosis: Hypothyroidism  Assessment and Plan of Treatment: Please take levothyroxine 50mcg daily for your hypothyroidism.

## 2024-01-29 NOTE — PROGRESS NOTE ADULT - SUBJECTIVE AND OBJECTIVE BOX
SUBJECTIVE / INTERVAL HPI: Patient was seen and examined this morning.     Overnight events:    01/28: lantus 12, lispro 5/5/3  01/27: lantus 13, lispro 5  01/26: lantus 13, lispro 3    CAPILLARY BLOOD GLUCOSE & INSULIN RECEIVED  84 mg/dL (01-27 @ 22:06)  100 mg/dL (01-27 @ 23:12)  151 mg/dL (01-27 @ 23:55)  272 mg/dL (01-28 @ 02:44)  278 mg/dL (01-28 @ 06:22)  257 mg/dL (01-28 @ 08:22)  294 mg/dL (01-28 @ 11:50)  138 mg/dL (01-28 @ 16:33)3  211 mg/dL (01-28 @ 21:43) 12 + 2  168 mg/dL (01-29 @ 08:17) 5 + 1      REVIEW OF SYSTEMS  Constitutional:  Negative fever, chills or loss of appetite.  Eyes:  Negative blurry vision or double vision.  Cardiovascular:  Negative for chest pain or palpitations.  Respiratory:  Negative for cough, wheezing, or shortness of breath.    Gastrointestinal:  Negative for nausea, vomiting, diarrhea, constipation, or abdominal pain.  Genitourinary:  Negative frequency, urgency or dysuria.  Neurologic:  No headache, confusion, dizziness, lightheadedness.    PHYSICAL EXAM  Vital Signs Last 24 Hrs  T(C): 36.6 (29 Jan 2024 06:30), Max: 37.2 (28 Jan 2024 20:17)  T(F): 97.8 (29 Jan 2024 06:30), Max: 98.9 (28 Jan 2024 20:17)  HR: 67 (29 Jan 2024 09:12) (67 - 85)  BP: 145/65 (29 Jan 2024 09:12) (133/71 - 145/65)  BP(mean): --  RR: 19 (29 Jan 2024 09:12) (17 - 19)  SpO2: 97% (29 Jan 2024 09:12) (95% - 98%)    Parameters below as of 29 Jan 2024 09:12  Patient On (Oxygen Delivery Method): room air        Constitutional: Awake, alert, in no acute distress.   HEENT: Normocephalic, atraumatic, RADHA.  Respiratory: Lungs clear to ausculation bilaterally.   Cardiovascular: regular rhythm, normal S1 and S2, no audible murmurs.   GI: soft, non-tender, non-distended, bowel sounds present.  Extremities: No lower extremity edema.  Psychiatric: AAO x 3. Normal affect/mood.     LABS  CBC - WBC/HGB/HTC/PLT: 8.06/11.1/32.9/191 (01-29-24)  BMP - Na/K/Cl/Bicarb/BUN/Cr/Gluc/AG/eGFR: 133/4.4/98/26/21/0.96/157/9/58 (01-29-24)  Ca - 9.6 (01-29-24)  Phos - -- (01-29-24)  Mg - 2.0 (01-29-24)  LFT - Alb/Tprot/Tbili/Dbili/AlkPhos/ALT/AST: 3.4/--/0.3/--/113/13/18 (01-28-24)    Thyroid Stimulating Hormone, Serum: 8.120 (01-26-24)  Total T4/Free T4: --/1.370 (01-26-24)      01-28-24 @ 07:01 - 01-29-24 @ 07:00  --------------------------------------------------------  IN: 780 mL / OUT: 0 mL / NET: 780 mL    01-29-24 @ 07:01 - 01-29-24 @ 10:03  --------------------------------------------------------  IN: 120 mL / OUT: 0 mL / NET: 120 mL        MEDICATIONS  MEDICATIONS  (STANDING):  amLODIPine   Tablet 10 milliGRAM(s) Oral daily  AQUAPHOR (petrolatum Ointment) 1 Application(s) Topical two times a day  bacitracin   Ointment 1 Application(s) Topical daily  dextrose 5%. 1000 milliLiter(s) (100 mL/Hr) IV Continuous <Continuous>  dextrose 5%. 1000 milliLiter(s) (50 mL/Hr) IV Continuous <Continuous>  dextrose 50% Injectable 12.5 Gram(s) IV Push once  dextrose 50% Injectable 25 Gram(s) IV Push once  dextrose 50% Injectable 25 Gram(s) IV Push once  enoxaparin Injectable 40 milliGRAM(s) SubCutaneous every 24 hours  folic acid 1 milliGRAM(s) Oral daily  glucagon  Injectable 1 milliGRAM(s) IntraMuscular once  insulin glargine Injectable (LANTUS) 12 Unit(s) SubCutaneous at bedtime  insulin lispro (ADMELOG) corrective regimen sliding scale   SubCutaneous Before meals and at bedtime  insulin lispro Injectable (ADMELOG) 5 Unit(s) SubCutaneous before breakfast  insulin lispro Injectable (ADMELOG) 3 Unit(s) SubCutaneous before dinner  insulin lispro Injectable (ADMELOG) 5 Unit(s) SubCutaneous before lunch  levothyroxine 50 MICROGram(s) Oral daily  metoprolol succinate ER 50 milliGRAM(s) Oral daily  pantoprazole    Tablet 40 milliGRAM(s) Oral before breakfast  polyethylene glycol 3350 17 Gram(s) Oral daily  triamcinolone 0.1% Cream 1 Application(s) Topical every 12 hours    MEDICATIONS  (PRN):  aluminum hydroxide/magnesium hydroxide/simethicone Suspension 30 milliLiter(s) Oral every 4 hours PRN Dyspepsia  dextrose Oral Gel 15 Gram(s) Oral once PRN Blood Glucose LESS THAN 70 milliGRAM(s)/deciliter  melatonin 3 milliGRAM(s) Oral at bedtime PRN Insomnia    ASSESSMENT / RECOMMENDATIONS    Ms. Kramer is a 88-year-old woman with recently diagnosed type 1 diabetes mellitus, hypothyroidism and gallbladder cancer (s/p chemo and Durvalumab) and psoriasis who presented to [facility] emergency department (01/26/24) with syncopal episode. Endocrinology was consulted for recommendations regarding management of diabetes and hypothyroidism, also given concern for adrenal insufficiency.    A1C: 8.2 %  BUN: 21  Creatinine: 0.96  GFR: 58  Weight: 53.1  BMI: 22.1      # Type 1 diabetes mellitus with hyperglycemia  - Please keeplantus 12 units at bedtime.   - Continue with lispro 5 units before breakfast and lunch. DECREASE lispro to 3 units before dinner.   - Encouraged patient to keep some form of carb with every meal.  - Continue lispro low dose sliding scale before meals and at bedtime.  - Patient's fingerstick glucose goal is 100-180 mg/dL.    - Discharge recommendations to be discussed.   - Patient can follow up at discharge with [facility] by calling [phone] to make an appointment.      # Hypothyroidism  - Likely from immunotherapy  - recently increased to levothyroxine 50 mcg  - TSH 8.120, Free T4 1.370  - Continue with levothyroxine 50 mcg daily in the morning, to be given with only with water, 1 hour before breakfast, on an empty stomach.    # Concern for adrenal insufficiency  - Cortisol levels been repeatedly >10, recent one here at 16.320  - She has been normotensive.  - No convincing evidence of adrenal insufficiency (although she is at risk from immunotherapy)  - Keep off steroids  - Would look for alternate causes of syncopal episode    Case discussed with Dr. Vogel. Primary team updated.       Simran Hicks  Endocrinology Fellow    Service Pager: [phone]    SUBJECTIVE / INTERVAL HPI: Patient was seen and examined this morning.     Overnight events:  pt reports feeling well  had eggs + toast + yogurt + orange for breakfast  had salad, spinach, walnut, sweet potatoes and orange    01/28: lantus 12, lispro 5/5/3  01/27: lantus 13, lispro 5  01/26: lantus 13, lispro 3    CAPILLARY BLOOD GLUCOSE & INSULIN RECEIVED  84 mg/dL (01-27 @ 22:06)  100 mg/dL (01-27 @ 23:12)  151 mg/dL (01-27 @ 23:55)  272 mg/dL (01-28 @ 02:44)  278 mg/dL (01-28 @ 06:22)  257 mg/dL (01-28 @ 08:22)  294 mg/dL (01-28 @ 11:50)  138 mg/dL (01-28 @ 16:33)3  211 mg/dL (01-28 @ 21:43) 12 + 2  168 mg/dL (01-29 @ 08:17) 5 + 1  172    REVIEW OF SYSTEMS  Constitutional:  Negative fever, chills or loss of appetite.  Eyes:  Negative blurry vision or double vision.  Cardiovascular:  Negative for chest pain or palpitations.  Respiratory:  Negative for cough, wheezing, or shortness of breath.    Gastrointestinal:  Negative for nausea, vomiting, diarrhea, constipation, or abdominal pain.  Genitourinary:  Negative frequency, urgency or dysuria.  Neurologic:  No headache, confusion, dizziness, lightheadedness.    PHYSICAL EXAM  Vital Signs Last 24 Hrs  T(C): 36.6 (29 Jan 2024 06:30), Max: 37.2 (28 Jan 2024 20:17)  T(F): 97.8 (29 Jan 2024 06:30), Max: 98.9 (28 Jan 2024 20:17)  HR: 67 (29 Jan 2024 09:12) (67 - 85)  BP: 145/65 (29 Jan 2024 09:12) (133/71 - 145/65)  BP(mean): --  RR: 19 (29 Jan 2024 09:12) (17 - 19)  SpO2: 97% (29 Jan 2024 09:12) (95% - 98%)    Parameters below as of 29 Jan 2024 09:12  Patient On (Oxygen Delivery Method): room air        Constitutional: Awake, alert, in no acute distress.   HEENT: Normocephalic, atraumatic, RADHA.  Respiratory: Lungs clear to ausculation bilaterally.   Cardiovascular: regular rhythm, normal S1 and S2, no audible murmurs.   GI: soft, non-tender, non-distended, bowel sounds present.  Extremities: No lower extremity edema.  Psychiatric: AAO x 3. Normal affect/mood.     LABS  CBC - WBC/HGB/HTC/PLT: 8.06/11.1/32.9/191 (01-29-24)  BMP - Na/K/Cl/Bicarb/BUN/Cr/Gluc/AG/eGFR: 133/4.4/98/26/21/0.96/157/9/58 (01-29-24)  Ca - 9.6 (01-29-24)  Phos - -- (01-29-24)  Mg - 2.0 (01-29-24)  LFT - Alb/Tprot/Tbili/Dbili/AlkPhos/ALT/AST: 3.4/--/0.3/--/113/13/18 (01-28-24)    Thyroid Stimulating Hormone, Serum: 8.120 (01-26-24)  Total T4/Free T4: --/1.370 (01-26-24)      01-28-24 @ 07:01 - 01-29-24 @ 07:00  --------------------------------------------------------  IN: 780 mL / OUT: 0 mL / NET: 780 mL    01-29-24 @ 07:01 - 01-29-24 @ 10:03  --------------------------------------------------------  IN: 120 mL / OUT: 0 mL / NET: 120 mL        MEDICATIONS  MEDICATIONS  (STANDING):  amLODIPine   Tablet 10 milliGRAM(s) Oral daily  AQUAPHOR (petrolatum Ointment) 1 Application(s) Topical two times a day  bacitracin   Ointment 1 Application(s) Topical daily  dextrose 5%. 1000 milliLiter(s) (100 mL/Hr) IV Continuous <Continuous>  dextrose 5%. 1000 milliLiter(s) (50 mL/Hr) IV Continuous <Continuous>  dextrose 50% Injectable 12.5 Gram(s) IV Push once  dextrose 50% Injectable 25 Gram(s) IV Push once  dextrose 50% Injectable 25 Gram(s) IV Push once  enoxaparin Injectable 40 milliGRAM(s) SubCutaneous every 24 hours  folic acid 1 milliGRAM(s) Oral daily  glucagon  Injectable 1 milliGRAM(s) IntraMuscular once  insulin glargine Injectable (LANTUS) 12 Unit(s) SubCutaneous at bedtime  insulin lispro (ADMELOG) corrective regimen sliding scale   SubCutaneous Before meals and at bedtime  insulin lispro Injectable (ADMELOG) 5 Unit(s) SubCutaneous before breakfast  insulin lispro Injectable (ADMELOG) 3 Unit(s) SubCutaneous before dinner  insulin lispro Injectable (ADMELOG) 5 Unit(s) SubCutaneous before lunch  levothyroxine 50 MICROGram(s) Oral daily  metoprolol succinate ER 50 milliGRAM(s) Oral daily  pantoprazole    Tablet 40 milliGRAM(s) Oral before breakfast  polyethylene glycol 3350 17 Gram(s) Oral daily  triamcinolone 0.1% Cream 1 Application(s) Topical every 12 hours    MEDICATIONS  (PRN):  aluminum hydroxide/magnesium hydroxide/simethicone Suspension 30 milliLiter(s) Oral every 4 hours PRN Dyspepsia  dextrose Oral Gel 15 Gram(s) Oral once PRN Blood Glucose LESS THAN 70 milliGRAM(s)/deciliter  melatonin 3 milliGRAM(s) Oral at bedtime PRN Insomnia    ASSESSMENT / RECOMMENDATIONS    Ms. Kramer is a 88-year-old woman with recently diagnosed type 1 diabetes mellitus, hypothyroidism and gallbladder cancer (s/p chemo and Durvalumab) and psoriasis who presented to [facility] emergency department (01/26/24) with syncopal episode. Endocrinology was consulted for recommendations regarding management of diabetes and hypothyroidism, also given concern for adrenal insufficiency.    A1C: 8.2 %  BUN: 21  Creatinine: 0.96  GFR: 58  Weight: 53.1  BMI: 22.1      # Type 1 diabetes mellitus with hyperglycemia  - Please c/w lantus 12 units at bedtime.   - Continue with lispro 5 units before breakfast and lunch. DECREASE lispro to 3 units before dinner.   - Encouraged patient to keep some form of carb with every meal.  - Continue lispro low dose sliding scale before meals and at bedtime.  - Patient's fingerstick glucose goal is 100-180 mg/dL.    - Discharge recommendations :  Basaglar 12 units + Aspart 5 with breakfast and lunch and 3 with dinner  - pt can follow up with Endocrinology at [facility].        # Hypothyroidism  - Likely from immunotherapy  - recently increased to levothyroxine 50 mcg  - TSH 8.120, Free T4 1.370  - Continue with levothyroxine 50 mcg daily in the morning, to be given with only with water, 1 hour before breakfast, on an empty stomach.    # Concern for adrenal insufficiency  - Cortisol levels been repeatedly >10, recent one here at 16.320  - She has been normotensive.  - No convincing evidence of adrenal insufficiency (although she is at risk from immunotherapy)  - Keep off steroids  - Would look for alternate causes of syncopal episode    Case discussed with Dr. Vogel. Primary team updated.       Simran Hicks  Endocrinology Fellow    Service Pager: [phone]

## 2024-01-29 NOTE — PROGRESS NOTE ADULT - ATTENDING COMMENTS
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, managed at [facility], s/p chemo until June 2023, now on immunotherapy Durvalumab which was stopped on Dec 6th 2023)   immunotherapy induced type 1 diabetes, hypothyroidism on supplement recently increased to 50 mcg per day, and plaque psoriasis using topical steroids with plan for systemic therapy, known LBBB ( at least since May 2023 ekg)   presented after experiencing a syncopal episode-initially admitted to medicine, evaluated by endocrine, unlikely hypoglycemic event, not adrenal insufficiency, LE duplex sono negative, CTH negative, transfer to cardiac tele for rhythm monitoring and further work up for possible cardiac cause for Syncope.    - care by cardiology appreciated,   seen with daughter present.   no arrhythmia noted on tele , waiting for echo and carotid sonogram , to follow up with cardiologist at [facility].  she is quick to stand up at bedside to greet us. no dizziness  RRR, good air entry bilaterally.  skin lesion in the limbs, back.   no edema noted on lower ext.  neuro - non focal.     Syncope -work up by cardiology, she has extensive work up in May,2023 with [facility] cardiologist, to follow up with [facility]     -Type 1 DM -HbA1c 8.2, c peptide -low-  immunotherapy induced,   endocrine evaluation appreciated, to continue with basal bolus approach, lantus 12 units with premeal insulin as per endocrine rec.     - Hypothyroidism on synthroid -recently increased to 50 mcg per day to continue for now , appreciated endocrine rec.    - plague psoriasis -family reported using topical steroid, pending systemic treatment from [facility]  - using hydrocortisone 2.5 % for face, for rest of body- triamcinolone 0.1 % topical BID, aquaphor bid for hydration ( for symptom treatment.)     -  Hyponatremia to 126 on admission, in setting of Hyperglycemia ( in setting of Hypothyroidism )- serum osmolality not checked , urine osmolality is low 270 ( excreting urine )  1/27- corrected sodium is 136 ( measured 132 with glucose 321 ),- agree with Endo -is not SIADH, would not require fluid restriction.   fu BMP   1/28- sodium 157- corrected sodium is 134 -  -CT finding of "Mucosal inflammation in the partially visualized right maxillary sinus and within the right ethmoid and frontal sinuses" clinically asymptomatic, no nasal congestion and no pain  if symptom develop would give Augmentin/ NS nasal spray.     # to give bacitracin topical to where IV infiltrated,( right wrist area)  will monitor, no sign of phlebitis     dvt prphylasix.    care by cardiology appreciated. med consult will follow giulia Hernandez, giulai daughter, primary team.

## 2024-01-29 NOTE — DISCHARGE NOTE PROVIDER - PROVIDER TOKENS
I would suggest waiting until Mid June because we haven't seen much of a decline in COVID cases in [facility] yet.   PROVIDER:[TOKEN:[[id number]],FOLLOWUP:[1 week]],FREE:[LAST:[Park],FIRST:[Roel],PHONE:[phone]],FAX:[(   )    -],ADDRESS:[[address] [place] [address]],FOLLOWUP:[1 week]]

## 2024-01-29 NOTE — DISCHARGE NOTE PROVIDER - NSDCMRMEDTOKEN_GEN_ALL_CORE_FT
folic acid 1 mg oral tablet: 1 tab(s) orally once a day  ibandronate 150 mg oral tablet: 1 tab(s) orally once a day  Norvasc 10 mg oral tablet: 1 tab(s) orally once a day  RABEprazole 20 mg oral delayed release tablet: 1 tab(s) orally once a day  Toprol-XL 50 mg oral tablet, extended release: 1 tab(s) orally once a day   Dexcom G7 : use as instructed  Dexcom G7 Sensor: please attach to the back of the arm or abdomen (to be changed every 10 days)  folic acid 1 mg oral tablet: 1 tab(s) orally once a day  ibandronate 150 mg oral tablet: 1 tab(s) orally once a day  insulin glargine 100 units/mL subcutaneous solution: 12 unit(s) subcutaneous once a day (at bedtime)  insulin lispro 100 units/mL injectable solution: 5 unit(s) injectable once a day before breakfast  insulin lispro 100 units/mL injectable solution: 5 unit(s) injectable once a day before lunch  insulin lispro 100 units/mL injectable solution: 3 unit(s) injectable once a day before dinner  levothyroxine 50 mcg (0.05 mg) oral tablet: 1 tab(s) orally once a day  Norvasc 10 mg oral tablet: 1 tab(s) orally once a day  RABEprazole 20 mg oral delayed release tablet: 1 tab(s) orally once a day  Toprol-XL 50 mg oral tablet, extended release: 1 tab(s) orally once a day  triamcinolone 0.1% topical cream: 1 Apply topically to affected area every 12 hours

## 2024-01-29 NOTE — PROGRESS NOTE ADULT - SUBJECTIVE AND OBJECTIVE BOX
OVERNIGHT EVENTS: ERIN    SUBJECTIVE:  Patient seen and examined at bedside.  C/o diffuse discoid skin lesions. Discussed care with daughter. Other ROS negative    Vital Signs Last 12 Hrs  T(F): 97.7 (01-29-24 @ 15:35), Max: 97.7 (01-29-24 @ 15:35)  HR: 73 (01-29-24 @ 15:35) (67 - 73)  BP: 140/65 (01-29-24 @ 15:35) (140/65 - 145/65)  BP(mean): --  RR: 19 (01-29-24 @ 15:35) (19 - 19)  SpO2: 97% (01-29-24 @ 09:12) (97% - 97%)  I&O's Summary    28 Jan 2024 07:01  -  29 Jan 2024 07:00  --------------------------------------------------------  IN: 780 mL / OUT: 0 mL / NET: 780 mL    29 Jan 2024 07:01  -  29 Jan 2024 19:04  --------------------------------------------------------  IN: 240 mL / OUT: 0 mL / NET: 240 mL    PHYSICAL EXAM:  Constitutional: NAD, comfortable in bed.  HEENT: NC/AT, PERRLA, EOMI, no conjunctival pallor or scleral icterus, MMM  Neck: Supple, no JVD  Respiratory: CTA B/L. No w/r/r.   Cardiovascular: RRR, normal S1 and S2, no m/r/g.   Gastrointestinal: +BS, soft NTND, no guarding or rebound tenderness, no palpable masses   Extremities: wwp; no cyanosis, clubbing or edema.   Vascular: Pulses equal and strong throughout.   Neurological: AAOx3, no CN deficits, strength and sensation intact throughout.   Skin: Diffuse discoid erythematous skin lesions    LABS:                        11.1   8.06  )-----------( 191      ( 29 Jan 2024 05:30 )             32.9     01-29    133<L>  |  98  |  21  ----------------------------<  157<H>  4.4   |  26  |  0.96    Ca    9.6      29 Jan 2024 05:30  Phos  2.3     01-28  Mg     2.0     01-29    TPro  7.1  /  Alb  3.4  /  TBili  0.3  /  DBili  x   /  AST  18  /  ALT  13  /  AlkPhos  113  01-28    Urinalysis Basic - ( 29 Jan 2024 05:30 )    Color: x / Appearance: x / SG: x / pH: x  Gluc: 157 mg/dL / Ketone: x  / Bili: x / Urobili: x   Blood: x / Protein: x / Nitrite: x   Leuk Esterase: x / RBC: x / WBC x   Sq Epi: x / Non Sq Epi: x / Bacteria: x    RADIOLOGY & ADDITIONAL TESTS:    MEDICATIONS  (STANDING):  amLODIPine   Tablet 10 milliGRAM(s) Oral daily  AQUAPHOR (petrolatum Ointment) 1 Application(s) Topical two times a day  bacitracin   Ointment 1 Application(s) Topical daily  dextrose 5%. 1000 milliLiter(s) (100 mL/Hr) IV Continuous <Continuous>  dextrose 5%. 1000 milliLiter(s) (50 mL/Hr) IV Continuous <Continuous>  dextrose 50% Injectable 12.5 Gram(s) IV Push once  dextrose 50% Injectable 25 Gram(s) IV Push once  dextrose 50% Injectable 25 Gram(s) IV Push once  enoxaparin Injectable 40 milliGRAM(s) SubCutaneous every 24 hours  folic acid 1 milliGRAM(s) Oral daily  glucagon  Injectable 1 milliGRAM(s) IntraMuscular once  insulin glargine Injectable (LANTUS) 12 Unit(s) SubCutaneous at bedtime  insulin lispro (ADMELOG) corrective regimen sliding scale   SubCutaneous Before meals and at bedtime  insulin lispro Injectable (ADMELOG) 5 Unit(s) SubCutaneous before lunch  insulin lispro Injectable (ADMELOG) 5 Unit(s) SubCutaneous before breakfast  insulin lispro Injectable (ADMELOG) 3 Unit(s) SubCutaneous before dinner  levothyroxine 50 MICROGram(s) Oral daily  metoprolol succinate ER 50 milliGRAM(s) Oral daily  pantoprazole    Tablet 40 milliGRAM(s) Oral before breakfast  polyethylene glycol 3350 17 Gram(s) Oral daily  triamcinolone 0.1% Cream 1 Application(s) Topical every 12 hours    MEDICATIONS  (PRN):  aluminum hydroxide/magnesium hydroxide/simethicone Suspension 30 milliLiter(s) Oral every 4 hours PRN Dyspepsia  dextrose Oral Gel 15 Gram(s) Oral once PRN Blood Glucose LESS THAN 70 milliGRAM(s)/deciliter  melatonin 3 milliGRAM(s) Oral at bedtime PRN Insomnia

## 2024-01-29 NOTE — PROGRESS NOTE ADULT - PROVIDER SPECIALTY LIST ADULT
Endocrinology
Endocrinology
Internal Medicine
Endocrinology
Internal Medicine
Hospitalist
Cardiology
Cardiology

## 2024-01-29 NOTE — DISCHARGE NOTE NURSING/CASE MANAGEMENT/SOCIAL WORK - PATIENT PORTAL LINK FT
You can access the FollowMyHealth Patient Portal offered by [facility] by registering at the following website: [email or web address]. By joining [email or web address]’s FollowMyHealth portal, you will also be able to view your health information using other applications (apps) compatible with our system.

## 2024-01-29 NOTE — CHART NOTE - NSCHARTNOTEFT_GEN_A_CORE
# Type 1 diabetes mellitus with hyperglycemia  - Discharge recommendations :  Basaglar 12 units + Aspart 5 with breakfast and lunch and 3 with dinner  - pt can follow up with Endocrinology at [facility].    # Hypothyroidism  - Likely from immunotherapy  - recently increased to levothyroxine 50 mcg  - TSH 8.120, Free T4 1.370  - Continue with levothyroxine 50 mcg daily in the morning, to be given with only with water, 1 hour before breakfast, on an empty stomach.    Case discussed with Dr. Vogel. Primary team updated. # Type 1 diabetes mellitus with hyperglycemia  - Discharge recommendations :  Basaglar 12 units + Aspart 5 with breakfast and lunch and 3 with dinner  - pt can follow up with Endocrinology at [facility].    # Hypothyroidism  - Likely from immunotherapy  - recently increased to levothyroxine 50 mcg  - TSH 8.120, Free T4 1.370  - Continue with levothyroxine 50 mcg daily in the morning, to be given with only with water, 1 hour before breakfast, on an empty stomach.    Case discussed with Dr. Vogel. Primary team updated.    Attending:  ABove discussed with Dr. Hicks and events of the weekend reviewed.  Glucoses still sub-optimally controlled but fairly stable.  Will need further adjustments as outpatient.  Follow up at [facility]    ISMAEL Vogel MD

## 2024-01-29 NOTE — DISCHARGE NOTE NURSING/CASE MANAGEMENT/SOCIAL WORK - NSDCVIVACCINE_GEN_ALL_CORE_FT
Tdap; 02-May-2023 10:31; Ilana Delgado (RN); [employer]; [id number] (Exp. Date: 15-Feb-2025); IntraMuscular; Deltoid Right.; 0.5 milliLiter(s); VIS (VIS Published: 09-May-2013, VIS Presented: 02-May-2023);

## 2024-01-29 NOTE — DISCHARGE NOTE PROVIDER - NSDCFUADDAPPT_GEN_ALL_CORE_FT
Patient can follow up at discharge with [facility] by calling [phone] to make an appointment.

## 2024-01-30 ENCOUNTER — EMERGENCY (EMERGENCY)
Facility: HOSPITAL | Age: 89
LOS: 1 days | Discharge: HOME | End: 2024-01-30
Attending: STUDENT IN AN ORGANIZED HEALTH CARE EDUCATION/TRAINING PROGRAM | Admitting: STUDENT IN AN ORGANIZED HEALTH CARE EDUCATION/TRAINING PROGRAM
Payer: MEDICARE

## 2024-01-30 VITALS
RESPIRATION RATE: 18 BRPM | HEIGHT: 61 IN | SYSTOLIC BLOOD PRESSURE: 118 MMHG | WEIGHT: 149.91 LBS | HEART RATE: 78 BPM | DIASTOLIC BLOOD PRESSURE: 71 MMHG | OXYGEN SATURATION: 99 % | TEMPERATURE: 98 F
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
## 2024-01-30 DIAGNOSIS — E03.9 HYPOTHYROIDISM, UNSPECIFIED: ICD-10-CM

## 2024-01-30 DIAGNOSIS — I44.7 LEFT BUNDLE-BRANCH BLOCK, UNSPECIFIED: ICD-10-CM

## 2024-01-30 DIAGNOSIS — R55 SYNCOPE AND COLLAPSE: ICD-10-CM

## 2024-01-30 DIAGNOSIS — C23 MALIGNANT NEOPLASM OF GALLBLADDER: ICD-10-CM

## 2024-01-30 DIAGNOSIS — I10 ESSENTIAL (PRIMARY) HYPERTENSION: ICD-10-CM

## 2024-01-30 DIAGNOSIS — M81.0 AGE-RELATED OSTEOPOROSIS WITHOUT CURRENT PATHOLOGICAL FRACTURE: ICD-10-CM

## 2024-01-30 DIAGNOSIS — E09.9 DRUG OR CHEMICAL INDUCED DIABETES MELLITUS WITHOUT COMPLICATIONS: ICD-10-CM

## 2024-01-30 DIAGNOSIS — S00.431A CONTUSION OF RIGHT EAR, INITIAL ENCOUNTER: ICD-10-CM

## 2024-01-30 DIAGNOSIS — K21.9 GASTRO-ESOPHAGEAL REFLUX DISEASE WITHOUT ESOPHAGITIS: ICD-10-CM

## 2024-01-30 DIAGNOSIS — L40.0 PSORIASIS VULGARIS: ICD-10-CM
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
## 2024-01-30 PROBLEM — E10.9 TYPE 1 DIABETES MELLITUS WITHOUT COMPLICATIONS: Chronic | Status: ACTIVE | Noted: 2024-01-26

## 2024-01-30 LAB
ALBUMIN SERPL ELPH-MCNC: 3.3 G/DL — SIGNIFICANT CHANGE UP (ref 3.3–5)
ALP SERPL-CCNC: 109 U/L — SIGNIFICANT CHANGE UP (ref 40–120)
ALT FLD-CCNC: 13 U/L — SIGNIFICANT CHANGE UP (ref 10–45)
ANION GAP SERPL CALC-SCNC: 12 MMOL/L — SIGNIFICANT CHANGE UP (ref 5–17)
AST SERPL-CCNC: 21 U/L — SIGNIFICANT CHANGE UP (ref 10–40)
BASOPHILS # BLD AUTO: 0.03 K/UL — SIGNIFICANT CHANGE UP (ref 0–0.2)
BASOPHILS NFR BLD AUTO: 0.3 % — SIGNIFICANT CHANGE UP (ref 0–2)
BILIRUB SERPL-MCNC: 0.5 MG/DL — SIGNIFICANT CHANGE UP (ref 0.2–1.2)
BUN SERPL-MCNC: 26 MG/DL — HIGH (ref 7–23)
CALCIUM SERPL-MCNC: 9.6 MG/DL — SIGNIFICANT CHANGE UP (ref 8.4–10.5)
CHLORIDE SERPL-SCNC: 97 MMOL/L — SIGNIFICANT CHANGE UP (ref 96–108)
CO2 SERPL-SCNC: 25 MMOL/L — SIGNIFICANT CHANGE UP (ref 22–31)
CREAT SERPL-MCNC: 1.1 MG/DL — SIGNIFICANT CHANGE UP (ref 0.5–1.3)
EGFR: 49 ML/MIN/1.73M2 — LOW
EOSINOPHIL # BLD AUTO: 0.23 K/UL — SIGNIFICANT CHANGE UP (ref 0–0.5)
EOSINOPHIL NFR BLD AUTO: 2.4 % — SIGNIFICANT CHANGE UP (ref 0–6)
GLUCOSE SERPL-MCNC: 163 MG/DL — HIGH (ref 70–99)
HCT VFR BLD CALC: 32.9 % — LOW (ref 34.5–45)
HGB BLD-MCNC: 11.2 G/DL — LOW (ref 11.5–15.5)
IMM GRANULOCYTES NFR BLD AUTO: 0.3 % — SIGNIFICANT CHANGE UP (ref 0–0.9)
LYMPHOCYTES # BLD AUTO: 1.5 K/UL — SIGNIFICANT CHANGE UP (ref 1–3.3)
LYMPHOCYTES # BLD AUTO: 15.9 % — SIGNIFICANT CHANGE UP (ref 13–44)
MCHC RBC-ENTMCNC: 30.5 PG — SIGNIFICANT CHANGE UP (ref 27–34)
MCHC RBC-ENTMCNC: 34 GM/DL — SIGNIFICANT CHANGE UP (ref 32–36)
MCV RBC AUTO: 89.6 FL — SIGNIFICANT CHANGE UP (ref 80–100)
MONOCYTES # BLD AUTO: 0.82 K/UL — SIGNIFICANT CHANGE UP (ref 0–0.9)
MONOCYTES NFR BLD AUTO: 8.7 % — SIGNIFICANT CHANGE UP (ref 2–14)
NEUTROPHILS # BLD AUTO: 6.83 K/UL — SIGNIFICANT CHANGE UP (ref 1.8–7.4)
NEUTROPHILS NFR BLD AUTO: 72.4 % — SIGNIFICANT CHANGE UP (ref 43–77)
NRBC # BLD: 0 /100 WBCS — SIGNIFICANT CHANGE UP (ref 0–0)
PLATELET # BLD AUTO: 201 K/UL — SIGNIFICANT CHANGE UP (ref 150–400)
POTASSIUM SERPL-MCNC: 4.4 MMOL/L — SIGNIFICANT CHANGE UP (ref 3.5–5.3)
POTASSIUM SERPL-SCNC: 4.4 MMOL/L — SIGNIFICANT CHANGE UP (ref 3.5–5.3)
PROT SERPL-MCNC: 7.1 G/DL — SIGNIFICANT CHANGE UP (ref 6–8.3)
RBC # BLD: 3.67 M/UL — LOW (ref 3.8–5.2)
RBC # FLD: 13.8 % — SIGNIFICANT CHANGE UP (ref 10.3–14.5)
SODIUM SERPL-SCNC: 134 MMOL/L — LOW (ref 135–145)
TROPONIN T, HIGH SENSITIVITY RESULT: 24 NG/L — SIGNIFICANT CHANGE UP (ref 0–51)
WBC # BLD: 9.44 K/UL — SIGNIFICANT CHANGE UP (ref 3.8–10.5)
WBC # FLD AUTO: 9.44 K/UL — SIGNIFICANT CHANGE UP (ref 3.8–10.5)

## 2024-01-30 PROCEDURE — 99283 EMERGENCY DEPT VISIT LOW MDM: CPT | Mod: 25

## 2024-01-30 PROCEDURE — 80053 COMPREHEN METABOLIC PANEL: CPT

## 2024-01-30 PROCEDURE — 82962 GLUCOSE BLOOD TEST: CPT

## 2024-01-30 PROCEDURE — 99285 EMERGENCY DEPT VISIT HI MDM: CPT

## 2024-01-30 PROCEDURE — 84484 ASSAY OF TROPONIN QUANT: CPT

## 2024-01-30 PROCEDURE — 36415 COLL VENOUS BLD VENIPUNCTURE: CPT

## 2024-01-30 PROCEDURE — 85025 COMPLETE CBC W/AUTO DIFF WBC: CPT

## 2024-01-30 RX ORDER — ACETAMINOPHEN 500 MG
650 TABLET ORAL ONCE
Refills: 0 | Status: COMPLETED | OUTPATIENT
Start: 2024-01-30 | End: 2024-01-30

## 2024-01-30 RX ADMIN — Medication 650 MILLIGRAM(S): at 11:10

## 2024-01-30 NOTE — ED ADULT NURSE NOTE - OBJECTIVE STATEMENT
88 yr old female c/o syncopal episode this morning. As per daughter pt had a similar episode last week. Daughter states that pts oncologist believes this is occurring due to adrenal insuffiencey from chemo treatment. Pt denies hitting head, however, pt states she lost consciousness for a few seconds. Hx of gallbladder cancer, DM, and HTN. Pt denies blood thinner use. Pt denies chest pain, SOB, weakness, dizziness, fevers, chills, N/V. Respirations spontaneous and unlabored. A&Ox4.

## 2024-01-30 NOTE — ED PROVIDER NOTE - PATIENT PORTAL LINK FT
You can access the FollowMyHealth Patient Portal offered by [facility] by registering at the following website: [email or web address]. By joining [facility]’s FollowMyHealth portal, you will also be able to view your health information using other applications (apps) compatible with our system.

## 2024-01-30 NOTE — ED ADULT TRIAGE NOTE - CHIEF COMPLAINT QUOTE
Pt BIBA accompanied by daughter A&Ox4 on arrival c/o syncopal episode this am. Pt denies any c/o on arrival. Per daughter per oncologist "he thinks its adrenal insufficiency r/t immunotherapy."

## 2024-01-30 NOTE — ED PROVIDER NOTE - CLINICAL SUMMARY MEDICAL DECISION MAKING FREE TEXT BOX
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 Well appearing here, asymptomatic, normal vitals. EKG nsr old LBBB nonischemic--unchanged from priors.     Per extensive discussion with daughter at bedside--recent admission work up for similar syncopal episode reviewed. She expresses concern that pt's oncologist believes it may be 2/2 immunotherapy induced adrenal insufficiency. However patient was formally seen by endocrinology service here and deemed not to have adrenal insufficiency. AM cortisol levels wnl, no persistent hypotension, no hyperkalemia, etc. I agree with this clinical assessment. Daughter understands that patient has had a comprehensive work up for syncope here that was negative (just discharged yesterday). Agreeable to [facility] to r/o new acute intracranial injury and dc with her outpatient providers--would like expeditious dc so that patient can make it to her 1 pm appointment @ [facility].

## 2024-01-30 NOTE — ED PROVIDER NOTE - PHYSICAL EXAMINATION
Gen - NAD; well-appearing; A+Ox3   HEENT - NCAT, EOMI, moist mucous membranes, clear oropharynx  Neck - supple, no c-spine tenderness, FROM  Resp - CTAB, no increased WOB  CV -  RRR, no m/r/g  Abd - soft, NT, ND; no guarding or rebound  Back - no midline, paraspinous, or CVA tenderness  MSK - FROM of b/l UE and LE, no gross deformities  Extrem - no LE edema/erythema/tenderness  Neuro - no focal motor or sensation deficits  Skin - warm, well perfused; mild swelling/ecchymosis to R helix, no appreciable hematoma

## 2024-01-30 NOTE — ED PROVIDER NOTE - PROGRESS NOTE DETAILS
Ryan Stevens MD: Patient still pending CTH to r/o acute intracranial pathology in setting of unwitnessed syncopal episode--daughter at bedside however stating she would like to be dc'd so patient can f/u with her outpatient appointment. Patient is still clearly mentating and neuro intact at bedside. Daughter understanding of the risk of foregoing emergent CTH imaging here. States she will f/u @ [facility] to have head imaging performed. Understands strict return precautions for mental status change, severe headache, vomiting, focal neuro deficit, etc.

## 2024-01-30 NOTE — ED PROVIDER NOTE - OBJECTIVE STATEMENT
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 Per patient and daughter collateral at bedside--patient had another syncopal episode while in the bathroom today, unclear if she hit her head but has some pain/swelling to R ear. Patient here denies having any symptoms. No chest pain, sob, fevers, chills, n/v/d.

## 2024-01-30 NOTE — ED ADULT NURSE NOTE - NSFALLUNIVINTERV_ED_ALL_ED
Bed/Stretcher in lowest position, wheels locked, appropriate side rails in place/Call bell, personal items and telephone in reach/Instruct patient to call for assistance before getting out of bed/chair/stretcher/Non-slip footwear applied when patient is off stretcher/[place] to call system/Physically safe environment - no spills, clutter or unnecessary equipment/Purposeful proactive rounding/Room/bathroom lighting operational, light cord in reach

## 2024-01-30 NOTE — ED PROVIDER NOTE - NSFOLLOWUPINSTRUCTIONS_ED_ALL_ED_FT
You were seen in the Emergency Department for: fainting episode    Please follow up with your primary physician, oncologist, and endocrinologist as discussed. If you do not have a primary physician or specialist of your needs, please call [phone] to find one convenient for you. At this number you will be able to locate a provider who accepts your insurance, as well as locate the right specialist for your needs.    You should return to the Emergency Department if you feel any new/worsening/persistent symptoms including but not limited to: fever, chills, vomiting, chest pain, difficulty breathing, loss of consciousness, bleeding, uncontrolled pain, numbness/weakness of a body part

## 2024-01-30 NOTE — ED ADULT NURSE REASSESSMENT NOTE - NS ED NURSE REASSESS COMMENT FT1
Pt refusing discharge vital signs, states needs to get to doctors appointments and cannot wait for vital signs.

## 2024-02-02 LAB
CORTICOSTEROID BINDING GLOBULIN RESULT: 2.2 MG/DL — SIGNIFICANT CHANGE UP
CORTIS F/TOTAL MFR SERPL: 39 % — SIGNIFICANT CHANGE UP
CORTIS SERPL-MCNC: 24 UG/DL — HIGH
CORTISOL, FREE RESULT: 9.4 UG/DL — HIGH

## 2024-02-08 DIAGNOSIS — R55 SYNCOPE AND COLLAPSE: ICD-10-CM

## 2024-02-08 DIAGNOSIS — T45.1X5A ADVERSE EFFECT OF ANTINEOPLASTIC AND IMMUNOSUPPRESSIVE DRUGS, INITIAL ENCOUNTER: ICD-10-CM

## 2024-02-08 DIAGNOSIS — L40.0 PSORIASIS VULGARIS: ICD-10-CM

## 2024-02-08 DIAGNOSIS — D63.0 ANEMIA IN NEOPLASTIC DISEASE: ICD-10-CM

## 2024-02-08 DIAGNOSIS — K21.9 GASTRO-ESOPHAGEAL REFLUX DISEASE WITHOUT ESOPHAGITIS: ICD-10-CM

## 2024-02-08 DIAGNOSIS — M81.0 AGE-RELATED OSTEOPOROSIS WITHOUT CURRENT PATHOLOGICAL FRACTURE: ICD-10-CM

## 2024-02-08 DIAGNOSIS — E10.65 TYPE 1 DIABETES MELLITUS WITH HYPERGLYCEMIA: ICD-10-CM

## 2024-02-08 DIAGNOSIS — Z79.890 HORMONE REPLACEMENT THERAPY: ICD-10-CM

## 2024-02-08 DIAGNOSIS — Z79.4 LONG TERM (CURRENT) USE OF INSULIN: ICD-10-CM

## 2024-02-08 DIAGNOSIS — I10 ESSENTIAL (PRIMARY) HYPERTENSION: ICD-10-CM

## 2024-02-08 DIAGNOSIS — C23 MALIGNANT NEOPLASM OF GALLBLADDER: ICD-10-CM

## 2024-02-08 DIAGNOSIS — E22.2 SYNDROME OF INAPPROPRIATE SECRETION OF ANTIDIURETIC HORMONE: ICD-10-CM

## 2024-02-08 DIAGNOSIS — E87.8 OTHER DISORDERS OF ELECTROLYTE AND FLUID BALANCE, NOT ELSEWHERE CLASSIFIED: ICD-10-CM

## 2024-02-08 DIAGNOSIS — E03.9 HYPOTHYROIDISM, UNSPECIFIED: ICD-10-CM
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
## 2024-03-06 NOTE — ED PROVIDER NOTE - ENMT, MLM
Addended by: KORTNEY HALL on: 3/6/2024 01:10 PM     Modules accepted: Orders    
Airway patent, Nasal mucosa clear. Mouth with normal mucosa. Throat has no vesicles, no oropharyngeal exudates and uvula is midline.

## 2024-05-02 NOTE — ED PROVIDER NOTE - GENITOURINARY [+], MLM
OK per Dr Sebastian--RX pended-pharmacy changed--pt notified RX went to correct pharmacy   urinary frequency

## 2024-10-21 ENCOUNTER — EMERGENCY (EMERGENCY)
Facility: HOSPITAL | Age: 89
LOS: 1 days | Discharge: HOME | End: 2024-10-21
Attending: STUDENT IN AN ORGANIZED HEALTH CARE EDUCATION/TRAINING PROGRAM | Admitting: STUDENT IN AN ORGANIZED HEALTH CARE EDUCATION/TRAINING PROGRAM
Payer: MEDICARE

## 2024-10-21 VITALS
TEMPERATURE: 98 F | OXYGEN SATURATION: 95 % | WEIGHT: 115.08 LBS | HEIGHT: 65 IN | DIASTOLIC BLOOD PRESSURE: 73 MMHG | SYSTOLIC BLOOD PRESSURE: 119 MMHG | HEART RATE: 80 BPM | RESPIRATION RATE: 18 BRPM

## 2024-10-21 VITALS
HEART RATE: 75 BPM | OXYGEN SATURATION: 97 % | TEMPERATURE: 98 F | SYSTOLIC BLOOD PRESSURE: 154 MMHG | RESPIRATION RATE: 20 BRPM | DIASTOLIC BLOOD PRESSURE: 85 MMHG

## 2024-10-21 LAB
ADD ON TEST-SPECIMEN IN LAB: SIGNIFICANT CHANGE UP
ANION GAP SERPL CALC-SCNC: 10 MMOL/L — SIGNIFICANT CHANGE UP (ref 5–17)
APPEARANCE UR: CLEAR — SIGNIFICANT CHANGE UP
BACTERIA # UR AUTO: NEGATIVE /HPF — SIGNIFICANT CHANGE UP
BASOPHILS # BLD AUTO: 0.04 K/UL — SIGNIFICANT CHANGE UP (ref 0–0.2)
BASOPHILS NFR BLD AUTO: 0.5 % — SIGNIFICANT CHANGE UP (ref 0–2)
BILIRUB UR-MCNC: NEGATIVE — SIGNIFICANT CHANGE UP
BUN SERPL-MCNC: 26 MG/DL — HIGH (ref 7–23)
CALCIUM SERPL-MCNC: 10 MG/DL — SIGNIFICANT CHANGE UP (ref 8.4–10.5)
CHLORIDE SERPL-SCNC: 99 MMOL/L — SIGNIFICANT CHANGE UP (ref 96–108)
CO2 SERPL-SCNC: 24 MMOL/L — SIGNIFICANT CHANGE UP (ref 22–31)
COLOR SPEC: YELLOW — SIGNIFICANT CHANGE UP
CREAT SERPL-MCNC: 1.14 MG/DL — SIGNIFICANT CHANGE UP (ref 0.5–1.3)
DIFF PNL FLD: NEGATIVE — SIGNIFICANT CHANGE UP
EGFR: 47 ML/MIN/1.73M2 — LOW
EOSINOPHIL # BLD AUTO: 0.06 K/UL — SIGNIFICANT CHANGE UP (ref 0–0.5)
EOSINOPHIL NFR BLD AUTO: 0.7 % — SIGNIFICANT CHANGE UP (ref 0–6)
GLUCOSE SERPL-MCNC: 230 MG/DL — HIGH (ref 70–99)
GLUCOSE UR QL: NEGATIVE MG/DL — SIGNIFICANT CHANGE UP
HCT VFR BLD CALC: 38.2 % — SIGNIFICANT CHANGE UP (ref 34.5–45)
HGB BLD-MCNC: 12.4 G/DL — SIGNIFICANT CHANGE UP (ref 11.5–15.5)
IMM GRANULOCYTES NFR BLD AUTO: 0.4 % — SIGNIFICANT CHANGE UP (ref 0–0.9)
KETONES UR-MCNC: NEGATIVE MG/DL — SIGNIFICANT CHANGE UP
LEUKOCYTE ESTERASE UR-ACNC: ABNORMAL
LYMPHOCYTES # BLD AUTO: 1.43 K/UL — SIGNIFICANT CHANGE UP (ref 1–3.3)
LYMPHOCYTES # BLD AUTO: 17.7 % — SIGNIFICANT CHANGE UP (ref 13–44)
MAGNESIUM SERPL-MCNC: 1.9 MG/DL — SIGNIFICANT CHANGE UP (ref 1.6–2.6)
MCHC RBC-ENTMCNC: 30.6 PG — SIGNIFICANT CHANGE UP (ref 27–34)
MCHC RBC-ENTMCNC: 32.5 GM/DL — SIGNIFICANT CHANGE UP (ref 32–36)
MCV RBC AUTO: 94.3 FL — SIGNIFICANT CHANGE UP (ref 80–100)
MONOCYTES # BLD AUTO: 0.73 K/UL — SIGNIFICANT CHANGE UP (ref 0–0.9)
MONOCYTES NFR BLD AUTO: 9.1 % — SIGNIFICANT CHANGE UP (ref 2–14)
NEUTROPHILS # BLD AUTO: 5.77 K/UL — SIGNIFICANT CHANGE UP (ref 1.8–7.4)
NEUTROPHILS NFR BLD AUTO: 71.6 % — SIGNIFICANT CHANGE UP (ref 43–77)
NITRITE UR-MCNC: NEGATIVE — SIGNIFICANT CHANGE UP
NRBC # BLD: 0 /100 WBCS — SIGNIFICANT CHANGE UP (ref 0–0)
PH UR: 7 — SIGNIFICANT CHANGE UP (ref 5–8)
PHOSPHATE SERPL-MCNC: 3.5 MG/DL — SIGNIFICANT CHANGE UP (ref 2.5–4.5)
PLATELET # BLD AUTO: 157 K/UL — SIGNIFICANT CHANGE UP (ref 150–400)
POTASSIUM SERPL-MCNC: 4.3 MMOL/L — SIGNIFICANT CHANGE UP (ref 3.5–5.3)
POTASSIUM SERPL-SCNC: 4.3 MMOL/L — SIGNIFICANT CHANGE UP (ref 3.5–5.3)
PROT UR-MCNC: NEGATIVE MG/DL — SIGNIFICANT CHANGE UP
RBC # BLD: 4.05 M/UL — SIGNIFICANT CHANGE UP (ref 3.8–5.2)
RBC # FLD: 14.1 % — SIGNIFICANT CHANGE UP (ref 10.3–14.5)
RBC CASTS # UR COMP ASSIST: 4 /HPF — SIGNIFICANT CHANGE UP (ref 0–4)
SODIUM SERPL-SCNC: 133 MMOL/L — LOW (ref 135–145)
SP GR SPEC: 1.01 — SIGNIFICANT CHANGE UP (ref 1–1.03)
SQUAMOUS # UR AUTO: 1 /HPF — SIGNIFICANT CHANGE UP (ref 0–5)
UROBILINOGEN FLD QL: 0.2 MG/DL — SIGNIFICANT CHANGE UP (ref 0.2–1)
WBC # BLD: 8.06 K/UL — SIGNIFICANT CHANGE UP (ref 3.8–10.5)
WBC # FLD AUTO: 8.06 K/UL — SIGNIFICANT CHANGE UP (ref 3.8–10.5)
WBC UR QL: 1 /HPF — SIGNIFICANT CHANGE UP (ref 0–5)

## 2024-10-21 PROCEDURE — 99284 EMERGENCY DEPT VISIT MOD MDM: CPT | Mod: FS

## 2024-10-21 PROCEDURE — 71045 X-RAY EXAM CHEST 1 VIEW: CPT | Mod: 26

## 2024-10-21 RX ORDER — SODIUM CHLORIDE 0.9 % (FLUSH) 0.9 %
1000 SYRINGE (ML) INJECTION ONCE
Refills: 0 | Status: COMPLETED | OUTPATIENT
Start: 2024-10-21 | End: 2024-10-21

## 2024-10-21 RX ADMIN — Medication 1000 MILLILITER(S): at 10:49

## 2024-10-21 NOTE — ED PROVIDER NOTE - OBJECTIVE STATEMENT
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  Pt stating she flew back from [place] a few days ago and didn't sleep well last night, was urinating frequently with no pain but up and to the bathroom a lot.  Pt stating this morning she was in the kitchen making breakfast and felt her legs were "shaky" and thought she may faint so sat herself down which is when her daughter walked in and as she was trying to get her up the pt's eyes fluttered and she didn't respond to her name for a second before coming around again.  Pt stating she then sat in a chair for a few minutes, EMS was called and brought to ED, stating she feels better now, has no complaints.  Denies fever, chills, CP, SOB, n/v, all other ROS negative.

## 2024-10-21 NOTE — ED PROVIDER NOTE - CLINICAL SUMMARY MEDICAL DECISION MAKING FREE TEXT BOX
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  Vitals and exam unremarkable in ED, EKG with no changes from prior.  Labs unremarkable.  EP consulted to interrogate ppm and no abnormal events found, functioning correctly.

## 2024-10-21 NOTE — ED PROVIDER NOTE - PATIENT PORTAL LINK FT
You can access the FollowMyHealth Patient Portal offered by [facility] by registering at the following website: [email or web address]. By joining [employer]’s FollowMyHealth portal, you will also be able to view your health information using other applications (apps) compatible with our system.

## 2024-10-21 NOTE — ED PROVIDER NOTE - PROGRESS NOTE DETAILS
pt feeling well, labs with no acute findings, trop at baseline, u/a neg.  Pt wanting to be d/c, daughter will bring her home, f/u pmd and cardiology, return to ED if sx worsen.

## 2024-10-21 NOTE — ED ADULT TRIAGE NOTE - CHIEF COMPLAINT QUOTE
pt bibems s/p near syncopal episode this morning. family states the pt "was sitting and her eyes rolled back". denies any falls or LOC. Pmhx of pacemaker

## 2024-10-21 NOTE — ED ADULT NURSE NOTE - NSFALLHARMRISKINTERV_ED_ALL_ED

## 2024-10-21 NOTE — ED ADULT TRIAGE NOTE - BIRTH SEX
Spoke with mom who reports that patient is still having significant yellow sinus discharge and a cough keeping her up. She is essentially the same as when she was in the office on 7/8/21. Mom states patient has not gotten better since been on Augmentin but hasn't gotten any worse either. No fevers.    Mom believes that right OM is better - no complaints but no complaints in the beginning either.     Watery diarrhea x3 days with episodes 4-5 times per day. Mother denies change in diet. Diarrhea is very watery and patient can't control it and is having accidents in pants. Mother has been giving probiotics and yogurt with no relief. No blood in diarrhea but some mucous. No cramping before diarrhea. Intake good and urine output good also.     Please advise - appointment? Thank you.    Female

## 2024-10-21 NOTE — ED ADULT NURSE NOTE - OBJECTIVE STATEMENT
89y female c/o near syncope. pt state she was making breakfast and felt like her legs were going to give out so she lowered herself to the ground. denies syncope. states she feels better now and is hungry. pt well appearing, denies head trauma. denies dizziness, CP, SOB, n/v/d, numbness/tingling, blurry vision. hx of DM1 and gallbladder ca. No acute distress noted at this time. ekg completed.

## 2024-10-21 NOTE — PROGRESS NOTE ADULT - SUBJECTIVE AND OBJECTIVE BOX
EPS Device interrogation    Indication: LBBB/syncope s/p AV micra implanted at [facility] by Dr. Layo Flores (2/14/24)   Pt presented with "weak legs" witnessed by her daughter who caught her and noted brief LOC while holding her. This occurred this morning while she was trying to make breakfast.   Just came [place] yesterday [place].     Device model: Micra AV2 [id number]    Functioning Mode: VDD  bpm. 	    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     Lead tests:   RV lead:    Sense: n/a             Threshold: 0.5 V at 0.24   ms.           Impedance:  610  ohms    Observation:  Normal pacemaker function. No events.   Syncope event unlikely from cardiac rhythm etiology.   D/W ER team.   pt has her routine PPM office visit with [facility] team tomorrow.

## 2024-10-21 NOTE — ED PROVIDER NOTE - NSFOLLOWUPINSTRUCTIONS_ED_ALL_ED_FT
Near Syncope    WHAT YOU NEED TO KNOW:    What is near syncope? Near syncope, also called presyncope, is the feeling that you may faint (lose consciousness), but you do not. Each time you have this feeling is called a near syncope episode.    What increases my risk for near syncope? Near syncope is often caused by a drop in your blood pressure that happens when you stand up quickly. The following can increase your risk for near syncope:    Certain medicines, such as medicine to lower your blood pressure    Dehydration    A low sodium or blood sugar level    An abnormal heart rhythm    Hyperventilation (breathing too quickly)  What signs and symptoms may occur before a near syncope episode?    Feeling dizzy or lightheaded    Nausea, feeling warm, sweating, or clammy skin    Blurred vision, or vision that is blacking out    Confusion    Trouble breathing    A fast or fluttering heartbeat, chest pain, or a weak pulse  How is the cause of near syncope diagnosed? Your healthcare provider will examine you and ask about your near syncope episodes. You may need any of the following to find out what is causing your symptoms:    Blood tests may be done to check your electrolyte levels, such as sodium. A problem with your electrolytes can lead to near syncope episodes.    Telemetry is continuous monitoring of your heart rhythm. Sticky pads placed on your skin connect to an EKG machine that records your heart rhythm.    An echocardiogram is a type of ultrasound. Sound waves are used to show the structure and function of your heart.    A stress test may show the changes that take place in your heart while it is under stress. Stress may be placed on your heart with exercise or medicine. Ask for more information about this test.    A tilt table test is used to check your heart and your blood pressure when you change positions.  Tilt Table Test      A Holter monitor is also called a portable electrocardiography (EKG) monitor. It shows your heart's electrical activity while you do your usual activities. The monitor is a small battery-operated device that you wear. It will show how fast your heart beats and if it beats in a regular pattern. Your provider may recommend this if you have episodes often over 2 to 3 days.  Holter Monitor  How is near syncope treated? Treatment depends on the cause of your near syncope. You may need any of the following:    Medicines may be needed to help your heart pump strongly and regularly. Your healthcare provider may also make changes to any medicines that are causing episodes.    Tilt training involves training yourself to stand for 10 to 30 minutes each day against a wall. This helps your body decrease the effects of posture changes and reduces your risk for episodes.  What can I do to manage near syncope?    Keep a record of your near syncope episodes. Include your symptoms and your activity before and after the episode. The record can help your healthcare provider find the cause of your near syncope and help you manage episodes.    Sit or lie down when needed. This includes when you feel dizzy, your throat is getting tight, and your vision changes. Raise your legs above the level of your heart.    Take slow, deep breaths if you start to breathe faster with anxiety or fear. This can help decrease dizziness and the feeling that you might faint.    Check your blood pressure often. This is important if you take medicine to lower your blood pressure. Check your blood pressure when you are lying down and when you are standing. Ask how often to check during the day. Keep a record of your blood pressure numbers. Your provider may use the record to help plan your treatment.  How to take a Blood Pressure  What can I do to prevent a near syncope episode?    Know and avoid your triggers. Certain events may bring on episodes. These events may cause you to feel under pressure, upset, or fearful. When you feel the symptoms, you can make movements to prevent an episode. For example, make a fist, cross your legs, squeeze your thighs together, or tighten your arm muscles.    Move slowly and let yourself get used to one position before you move to another position. This is very important when you change from a lying or sitting position to a standing position. Take some deep breaths before you stand up from a lying position. Stand up slowly. Sudden movements may cause a fainting spell. Sit on the side of the bed or couch for a few minutes before you stand up. If you are on bedrest, try to be upright for about 2 hours each day, or as directed. Do not lock your legs if you are standing for a long period of time. Move your legs and bend your knees to keep blood flowing.    Follow your healthcare provider's recommendations. Your provider may recommend that you drink more liquids to prevent dehydration. You may also need to have more salt to keep your blood pressure from dropping too low and causing syncope. Your provider will tell you how much liquid and sodium to have each day. Your provider will also tell you how much physical activity is safe for you. This will depend on what is causing your near syncope.    Watch for signs of low blood sugar. These include hunger, nervousness, sweating, and fast or fluttery heartbeats. Talk with your provider about ways to keep your blood sugar level steady.    Do not strain if you are constipated. You may faint if you strain to have a bowel movement. Walking is the best way to get your bowels moving. Eat foods high in fiber to make it easier to have a bowel movement. Good examples are high-fiber cereals, beans, vegetables, and whole-grain breads. Prune juice may help make bowel movements softer.    Be careful in hot weather. Heat can cause a syncope episode. Limit activity done outside on hot days. Physical activity in hot weather can lead to dehydration. This can cause an episode.  Call your local emergency department (911 in the [place]) or have someone call if:    You have sudden chest pain.    You have trouble breathing or shortness of breath.  When should I seek immediate care?    You have vision changes, are sweating, and have nausea while you are sitting or lying down.    You feel dizzy or flushed and your heart is fluttering.    You lose consciousness.    You cannot use your arm, hand, foot, or leg, or it feels weak.    You have trouble speaking or understanding others when they speak.  When should I call my doctor?    You have new or worsening symptoms.    Your heart beats faster or slower than usual.    You have questions or concerns about your condition or care.  CARE AGREEMENT:    You have the right to help plan your care. Learn about your health condition and how it may be treated. Discuss treatment options with your healthcare providers to decide what care you want to receive. You always have the right to refuse treatment.

## 2024-10-23 DIAGNOSIS — R55 SYNCOPE AND COLLAPSE: ICD-10-CM

## 2024-10-23 DIAGNOSIS — I10 ESSENTIAL (PRIMARY) HYPERTENSION: ICD-10-CM

## 2024-10-23 DIAGNOSIS — E03.9 HYPOTHYROIDISM, UNSPECIFIED: ICD-10-CM

## 2024-10-23 DIAGNOSIS — C23 MALIGNANT NEOPLASM OF GALLBLADDER: ICD-10-CM

## 2024-10-23 DIAGNOSIS — Z79.4 LONG TERM (CURRENT) USE OF INSULIN: ICD-10-CM

## 2024-10-23 DIAGNOSIS — Z95.0 PRESENCE OF CARDIAC PACEMAKER: ICD-10-CM

## 2024-10-23 DIAGNOSIS — M81.0 AGE-RELATED OSTEOPOROSIS WITHOUT CURRENT PATHOLOGICAL FRACTURE: ICD-10-CM

## 2024-10-23 DIAGNOSIS — E11.9 TYPE 2 DIABETES MELLITUS WITHOUT COMPLICATIONS: ICD-10-CM

## 2024-10-23 DIAGNOSIS — K21.9 GASTRO-ESOPHAGEAL REFLUX DISEASE WITHOUT ESOPHAGITIS: ICD-10-CM

## 2024-11-20 PROCEDURE — 36415 COLL VENOUS BLD VENIPUNCTURE: CPT

## 2024-11-20 PROCEDURE — 85025 COMPLETE CBC W/AUTO DIFF WBC: CPT

## 2024-11-20 PROCEDURE — 36000 PLACE NEEDLE IN VEIN: CPT

## 2024-11-20 PROCEDURE — 84484 ASSAY OF TROPONIN QUANT: CPT

## 2024-11-20 PROCEDURE — 81001 URINALYSIS AUTO W/SCOPE: CPT

## 2024-11-20 PROCEDURE — 80048 BASIC METABOLIC PNL TOTAL CA: CPT

## 2024-11-20 PROCEDURE — 83735 ASSAY OF MAGNESIUM: CPT

## 2024-11-20 PROCEDURE — 71045 X-RAY EXAM CHEST 1 VIEW: CPT

## 2024-11-20 PROCEDURE — 99283 EMERGENCY DEPT VISIT LOW MDM: CPT | Mod: 25

## 2024-11-20 PROCEDURE — 84100 ASSAY OF PHOSPHORUS: CPT
